# Patient Record
Sex: FEMALE | Race: ASIAN | Employment: FULL TIME | URBAN - METROPOLITAN AREA
[De-identification: names, ages, dates, MRNs, and addresses within clinical notes are randomized per-mention and may not be internally consistent; named-entity substitution may affect disease eponyms.]

---

## 2017-03-02 ENCOUNTER — OFFICE VISIT (OUTPATIENT)
Dept: FAMILY MEDICINE CLINIC | Facility: CLINIC | Age: 27
End: 2017-03-02

## 2017-03-02 VITALS
DIASTOLIC BLOOD PRESSURE: 70 MMHG | SYSTOLIC BLOOD PRESSURE: 116 MMHG | BODY MASS INDEX: 30.5 KG/M2 | TEMPERATURE: 103 F | HEART RATE: 108 BPM | OXYGEN SATURATION: 98 % | WEIGHT: 170 LBS | HEIGHT: 62.5 IN | RESPIRATION RATE: 16 BRPM

## 2017-03-02 DIAGNOSIS — H65.192 OTHER ACUTE NONSUPPURATIVE OTITIS MEDIA OF LEFT EAR, RECURRENCE NOT SPECIFIED: Primary | ICD-10-CM

## 2017-03-02 DIAGNOSIS — R68.89 INFLUENZA-LIKE SYMPTOMS: ICD-10-CM

## 2017-03-02 PROCEDURE — 99202 OFFICE O/P NEW SF 15 MIN: CPT | Performed by: NURSE PRACTITIONER

## 2017-03-02 RX ORDER — FLUTICASONE PROPIONATE 50 MCG
2 SPRAY, SUSPENSION (ML) NASAL DAILY
Qty: 1 BOTTLE | Refills: 0 | Status: SHIPPED | OUTPATIENT
Start: 2017-03-02 | End: 2017-03-16

## 2017-03-02 RX ORDER — AMOXICILLIN 875 MG/1
875 TABLET, COATED ORAL 2 TIMES DAILY
Qty: 20 TABLET | Refills: 0 | Status: SHIPPED | OUTPATIENT
Start: 2017-03-02 | End: 2017-03-12

## 2017-03-02 NOTE — PROGRESS NOTES
Patient presents with:  Fever: 102.6 today, body aches, chills, cough and congestion. 1 emesis this am.   :    HPI:   Johnson Forward is a 28year old female who presents for upper respiratory symptoms for  2  days. Started suddenly. Getting worse.  Kasia Marie EARS:Left TM erythematous, + bulging, no retraction, no fluid, bony landmarks not visualized. Right TM clear pinkish, no bulging, no retraction, no fluid, bony landmarks visualized.    NOSE: nostrils patent, clear nasal drainage, nasal mucosa pinkish red an amoxicillin 875 MG Oral Tab 20 tablet 0      Sig: Take 1 tablet (875 mg total) by mouth 2 (two) times daily. Fluticasone Propionate 50 MCG/ACT Nasal Suspension 1 Bottle 0      Si sprays by Each Nare route daily.              Patient Instructions Your healthcare provider may check for fluid in the ear using a light called an otoscope to look into the ear canal. A puff of air is blown into the ear and your provider looks for movement of the eardrum.  If there is fluid behind the eardrum, the membrane If you have discharge from your ear, you can wipe it away with a washcloth and loosely plug the ear with cotton to catch further drainage. If you have a lot of fluid and pus draining from your ear, the eardrum has probably ruptured.  Ask your healthcare pro Influenza is also called the flu. It is a viral illness that affects the air passages of your lungs. It is different from the common cold. The flu can easily be passed from one to person to another.  It may be spread through the air by coughing and sneezing If you are 72 or older, talk with your provider about getting a pneumococcal vaccine every 5 years. You should also get this vaccine if you have chronic asthma or COPD. All adults should get a flu vaccine every fall. Ask your provider about this.   When to

## 2017-03-02 NOTE — PATIENT INSTRUCTIONS
· It is very important to complete full course of antibiotic. · REST AND FLUIDS  · Flonase  · Theraflu or Dayquil as packet insert.    · Robitussin DM as packet insert   · Acetaminophen or ibuprofen according to package instructions as needed for pain  · Antibiotic medicine is a common treatment for ear infections. However, recent studies have shown that the symptoms of ear infections often go away in a couple of days without antibiotics.  Bacteria can become resistant to antibiotics, and the medicine may c If you have a fever:   Rest until your temperature has fallen below 100°F (37.8°C). Then become as active as is comfortable. Ask your provider if you can take aspirin, acetaminophen, or ibuprofen to control your fever.  Anyone under the age of 24 with a v Symptoms of the flu may be mild or severe. They can include extreme tiredness (wanting to stay in bed all day), chills, fevers, muscle aches, soreness with eye movement, headache, and a dry, hacking cough.   Home care  Follow these guidelines when caring fo · Severe weakness or dizziness  · You get a fever or cough after getting better for a few days  Date Last Reviewed: 12/23/2014  © 4814-4756 The [address] [place] [address], [address] [place] [address]. All rights reserved.  This information is not i

## 2019-01-08 PROBLEM — Z86.2 HISTORY OF ANEMIA: Status: ACTIVE | Noted: 2019-01-08

## 2019-03-13 ENCOUNTER — LABORATORY ENCOUNTER (OUTPATIENT)
Dept: LAB | Facility: HOSPITAL | Age: 29
End: 2019-03-13
Payer: COMMERCIAL

## 2019-03-13 DIAGNOSIS — O02.1 MISSED AB: ICD-10-CM

## 2019-03-13 LAB
ANTIBODY SCREEN: NEGATIVE
RH BLOOD TYPE: POSITIVE

## 2019-03-13 PROCEDURE — 86850 RBC ANTIBODY SCREEN: CPT

## 2019-03-13 PROCEDURE — 86901 BLOOD TYPING SEROLOGIC RH(D): CPT

## 2019-03-13 PROCEDURE — 86900 BLOOD TYPING SEROLOGIC ABO: CPT

## 2019-03-26 ENCOUNTER — TELEPHONE (OUTPATIENT)
Dept: OBGYN UNIT | Facility: HOSPITAL | Age: 29
End: 2019-03-26

## 2019-08-02 LAB
ANTIBODY SCREEN OB: NEGATIVE
HEPATITIS B SURFACE ANTIGEN OB: NEGATIVE
HIV RESULT OB: NEGATIVE
RAPID PLASMA REAGIN OB: NONREACTIVE
RH FACTOR OB: POSITIVE

## 2019-10-16 ENCOUNTER — DIABETIC EDUCATION (OUTPATIENT)
Dept: ENDOCRINOLOGY CLINIC | Facility: CLINIC | Age: 29
End: 2019-10-16
Payer: COMMERCIAL

## 2019-10-16 VITALS — BODY MASS INDEX: 31.83 KG/M2 | HEIGHT: 62 IN | WEIGHT: 173 LBS

## 2019-10-16 DIAGNOSIS — O24.410 DIET CONTROLLED GESTATIONAL DIABETES MELLITUS (GDM) IN SECOND TRIMESTER: Primary | ICD-10-CM

## 2019-10-16 PROCEDURE — 97802 MEDICAL NUTRITION INDIV IN: CPT | Performed by: DIETITIAN, REGISTERED

## 2019-10-16 NOTE — PROGRESS NOTES
Glendy Courtney  JONES:[birth year] was seen for Gestational Diabetes Counseling: Individual/Group    Date: 10/16/2019  Referring Provider: Dr. Mick Red  Start time: 2:30 End time: 3:35    Assessment:Ht 62\"   Wt 173 lb (78.5 kg)   BMI 31.64 kg/m²     Histor support encouraged. Identification of lifestyle behaviors willing to change discussed. Training Tools Provided:  [facility] Diabetes and Pregnancy: A guide to self management  BG Log Sheets    Recommendations:      1. Follow recommended meal plan.

## 2019-10-24 ENCOUNTER — TELEPHONE (OUTPATIENT)
Dept: ENDOCRINOLOGY CLINIC | Facility: CLINIC | Age: 29
End: 2019-10-24

## 2019-10-24 DIAGNOSIS — O24.410 DIET CONTROLLED GESTATIONAL DIABETES MELLITUS (GDM) IN SECOND TRIMESTER: ICD-10-CM

## 2019-12-12 NOTE — PROGRESS NOTES
Outpatient Maternal-Fetal Medicine Consultation    Dear Dr. Kacy Mcgregor,    Thank you for requesting ultrasound evaluation and maternal fetal medicine consultation on your patient Dayton Márquez.   As you are aware she is a 26year old female with a Single 06/15/2019   BMI 31.28 kg/m²   General: alert and oriented,no acute distress  Abdomen: gravid, soft, non-tender  Extremities: non-tender, no edema    OBSTETRIC ULTRASOUND  The patient had a level II ultrasound today which I interpreted the results and revi left outflow tract, right outflow tract, Ductal arch, inferior vena cava, superior vena cava. Aortic arch: Normal.  Genitalia: normal.    Summary of Ultrasound Findings:  Impression: The fetal measurements are consistent with the established EDC.  No yehuda assessments (fasting and two-hour post-prandial) is good. Her overall glucose control is good. The patient is presently on diet therapy; her compliance with her diabetic diet regimen was reviewed and it is good.       We compared and contrasted insulin weeks  · Delivery is advised between 38-39 weeks for controlled GDM A2 but routine delivery care for GDM A1    Thank you for allowing me to participate in the care of your patient.   Please do not hesitate to contact me if additional questions or concerns a

## 2019-12-19 ENCOUNTER — OFFICE VISIT (OUTPATIENT)
Dept: PERINATAL CARE | Facility: HOSPITAL | Age: 29
End: 2019-12-19
Attending: OBSTETRICS & GYNECOLOGY
Payer: COMMERCIAL

## 2019-12-19 VITALS
WEIGHT: 171 LBS | DIASTOLIC BLOOD PRESSURE: 67 MMHG | BODY MASS INDEX: 31.47 KG/M2 | HEIGHT: 62 IN | SYSTOLIC BLOOD PRESSURE: 112 MMHG | HEART RATE: 103 BPM

## 2019-12-19 DIAGNOSIS — Z86.2 HISTORY OF ANEMIA: ICD-10-CM

## 2019-12-19 DIAGNOSIS — O24.410 DIET CONTROLLED GESTATIONAL DIABETES MELLITUS (GDM) IN THIRD TRIMESTER: ICD-10-CM

## 2019-12-19 PROCEDURE — 99244 OFF/OP CNSLTJ NEW/EST MOD 40: CPT | Performed by: OBSTETRICS & GYNECOLOGY

## 2019-12-19 PROCEDURE — 76811 OB US DETAILED SNGL FETUS: CPT | Performed by: OBSTETRICS & GYNECOLOGY

## 2019-12-19 RX ORDER — ACETAMINOPHEN 160 MG
2000 TABLET,DISINTEGRATING ORAL DAILY
COMMUNITY

## 2019-12-19 RX ORDER — MELATONIN
325
COMMUNITY

## 2019-12-19 RX ORDER — CHOLECALCIFEROL (VITAMIN D3) 25 MCG
1 TABLET,CHEWABLE ORAL DAILY
COMMUNITY

## 2020-01-02 LAB — HIV RESULT OB: NEGATIVE

## 2020-01-08 ENCOUNTER — TELEPHONE (OUTPATIENT)
Dept: PERINATAL CARE | Facility: HOSPITAL | Age: 30
End: 2020-01-08

## 2020-01-21 DIAGNOSIS — O24.410 DIET CONTROLLED GESTATIONAL DIABETES MELLITUS (GDM) IN SECOND TRIMESTER: Primary | ICD-10-CM

## 2020-01-28 ENCOUNTER — TELEPHONE (OUTPATIENT)
Dept: PERINATAL CARE | Facility: HOSPITAL | Age: 30
End: 2020-01-28

## 2020-02-25 LAB — STREP GP B CULT OB: NEGATIVE

## 2020-03-03 ENCOUNTER — TELEPHONE (OUTPATIENT)
Dept: OBGYN UNIT | Facility: HOSPITAL | Age: 30
End: 2020-03-03

## 2020-03-04 ENCOUNTER — TELEPHONE (OUTPATIENT)
Dept: OBGYN UNIT | Facility: HOSPITAL | Age: 30
End: 2020-03-04

## 2020-03-08 ENCOUNTER — HOSPITAL ENCOUNTER (INPATIENT)
Facility: HOSPITAL | Age: 30
LOS: 3 days | Discharge: HOME | End: 2020-03-11
Attending: OBSTETRICS & GYNECOLOGY | Admitting: OBSTETRICS & GYNECOLOGY
Payer: COMMERCIAL

## 2020-03-08 ENCOUNTER — APPOINTMENT (OUTPATIENT)
Dept: OBGYN CLINIC | Facility: HOSPITAL | Age: 30
End: 2020-03-08
Payer: COMMERCIAL

## 2020-03-08 PROBLEM — Z34.90 PREGNANCY (HCC): Status: ACTIVE | Noted: 2020-03-08

## 2020-03-08 PROBLEM — Z34.90 PREGNANCY: Status: ACTIVE | Noted: 2020-03-08

## 2020-03-08 LAB
ANTIBODY SCREEN: NEGATIVE
DEPRECATED RDW RBC AUTO: 41.1 FL (ref 35.1–46.3)
ERYTHROCYTE [DISTWIDTH] IN BLOOD BY AUTOMATED COUNT: 17.2 % (ref 11–15)
GLUCOSE BLD-MCNC: 94 MG/DL (ref 70–99)
HCT VFR BLD AUTO: 39.5 % (ref 35–48)
HGB BLD-MCNC: 12.1 G/DL (ref 12–16)
MCH RBC QN AUTO: 22.1 PG (ref 26–34)
MCHC RBC AUTO-ENTMCNC: 30.6 G/DL (ref 31–37)
MCV RBC AUTO: 72.1 FL (ref 80–100)
PLATELET # BLD AUTO: 296 10(3)UL (ref 150–450)
RBC # BLD AUTO: 5.48 X10(6)UL (ref 3.8–5.3)
RH BLOOD TYPE: POSITIVE
T PALLIDUM AB SER QL IA: NONREACTIVE
WBC # BLD AUTO: 8.8 X10(3) UL (ref 4–11)

## 2020-03-08 PROCEDURE — 3E0P7VZ INTRODUCTION OF HORMONE INTO FEMALE REPRODUCTIVE, VIA NATURAL OR ARTIFICIAL OPENING: ICD-10-PCS | Performed by: OBSTETRICS & GYNECOLOGY

## 2020-03-08 PROCEDURE — 86901 BLOOD TYPING SEROLOGIC RH(D): CPT | Performed by: OBSTETRICS & GYNECOLOGY

## 2020-03-08 PROCEDURE — 86900 BLOOD TYPING SEROLOGIC ABO: CPT | Performed by: OBSTETRICS & GYNECOLOGY

## 2020-03-08 PROCEDURE — 86850 RBC ANTIBODY SCREEN: CPT | Performed by: OBSTETRICS & GYNECOLOGY

## 2020-03-08 PROCEDURE — 82962 GLUCOSE BLOOD TEST: CPT

## 2020-03-08 PROCEDURE — 86780 TREPONEMA PALLIDUM: CPT | Performed by: OBSTETRICS & GYNECOLOGY

## 2020-03-08 PROCEDURE — 85027 COMPLETE CBC AUTOMATED: CPT | Performed by: OBSTETRICS & GYNECOLOGY

## 2020-03-08 RX ORDER — TERBUTALINE SULFATE 1 MG/ML
0.25 INJECTION, SOLUTION SUBCUTANEOUS AS NEEDED
Status: DISCONTINUED | OUTPATIENT
Start: 2020-03-08 | End: 2020-03-09 | Stop reason: HOSPADM

## 2020-03-08 RX ORDER — SODIUM CHLORIDE, SODIUM LACTATE, POTASSIUM CHLORIDE, CALCIUM CHLORIDE 600; 310; 30; 20 MG/100ML; MG/100ML; MG/100ML; MG/100ML
INJECTION, SOLUTION INTRAVENOUS CONTINUOUS
Status: DISCONTINUED | OUTPATIENT
Start: 2020-03-08 | End: 2020-03-09 | Stop reason: HOSPADM

## 2020-03-08 RX ORDER — IBUPROFEN 600 MG/1
600 TABLET ORAL ONCE AS NEEDED
Status: DISCONTINUED | OUTPATIENT
Start: 2020-03-08 | End: 2020-03-09 | Stop reason: HOSPADM

## 2020-03-08 RX ORDER — TRISODIUM CITRATE DIHYDRATE AND CITRIC ACID MONOHYDRATE 500; 334 MG/5ML; MG/5ML
30 SOLUTION ORAL AS NEEDED
Status: DISCONTINUED | OUTPATIENT
Start: 2020-03-08 | End: 2020-03-09 | Stop reason: HOSPADM

## 2020-03-08 RX ORDER — DEXTROSE, SODIUM CHLORIDE, SODIUM LACTATE, POTASSIUM CHLORIDE, AND CALCIUM CHLORIDE 5; .6; .31; .03; .02 G/100ML; G/100ML; G/100ML; G/100ML; G/100ML
INJECTION, SOLUTION INTRAVENOUS AS NEEDED
Status: DISCONTINUED | OUTPATIENT
Start: 2020-03-08 | End: 2020-03-09 | Stop reason: HOSPADM

## 2020-03-08 RX ORDER — ZOLPIDEM TARTRATE 5 MG/1
5 TABLET ORAL NIGHTLY PRN
Status: DISCONTINUED | OUTPATIENT
Start: 2020-03-08 | End: 2020-03-09 | Stop reason: HOSPADM

## 2020-03-09 ENCOUNTER — ANESTHESIA EVENT (OUTPATIENT)
Dept: OBGYN UNIT | Facility: HOSPITAL | Age: 30
End: 2020-03-09
Payer: COMMERCIAL

## 2020-03-09 ENCOUNTER — ANESTHESIA (OUTPATIENT)
Dept: OBGYN UNIT | Facility: HOSPITAL | Age: 30
End: 2020-03-09
Payer: COMMERCIAL

## 2020-03-09 LAB
GLUCOSE BLD-MCNC: 101 MG/DL (ref 70–99)
GLUCOSE BLD-MCNC: 106 MG/DL (ref 70–99)
GLUCOSE BLD-MCNC: 172 MG/DL (ref 70–99)
GLUCOSE BLD-MCNC: 59 MG/DL (ref 70–99)
GLUCOSE BLD-MCNC: 72 MG/DL (ref 70–99)
GLUCOSE BLD-MCNC: 85 MG/DL (ref 70–99)

## 2020-03-09 PROCEDURE — 3E033VJ INTRODUCTION OF OTHER HORMONE INTO PERIPHERAL VEIN, PERCUTANEOUS APPROACH: ICD-10-PCS | Performed by: OBSTETRICS & GYNECOLOGY

## 2020-03-09 PROCEDURE — 10907ZC DRAINAGE OF AMNIOTIC FLUID, THERAPEUTIC FROM PRODUCTS OF CONCEPTION, VIA NATURAL OR ARTIFICIAL OPENING: ICD-10-PCS | Performed by: OBSTETRICS & GYNECOLOGY

## 2020-03-09 PROCEDURE — 88307 TISSUE EXAM BY PATHOLOGIST: CPT | Performed by: OBSTETRICS & GYNECOLOGY

## 2020-03-09 PROCEDURE — 82962 GLUCOSE BLOOD TEST: CPT

## 2020-03-09 PROCEDURE — 0KQM0ZZ REPAIR PERINEUM MUSCLE, OPEN APPROACH: ICD-10-PCS | Performed by: OBSTETRICS & GYNECOLOGY

## 2020-03-09 RX ORDER — SIMETHICONE 80 MG
80 TABLET,CHEWABLE ORAL 3 TIMES DAILY PRN
Status: DISCONTINUED | OUTPATIENT
Start: 2020-03-09 | End: 2020-03-11

## 2020-03-09 RX ORDER — IBUPROFEN 600 MG/1
600 TABLET ORAL EVERY 6 HOURS
Status: DISCONTINUED | OUTPATIENT
Start: 2020-03-09 | End: 2020-03-11

## 2020-03-09 RX ORDER — DOCUSATE SODIUM 100 MG/1
100 CAPSULE, LIQUID FILLED ORAL
Status: DISCONTINUED | OUTPATIENT
Start: 2020-03-09 | End: 2020-03-11

## 2020-03-09 RX ORDER — ZOLPIDEM TARTRATE 5 MG/1
5 TABLET ORAL NIGHTLY PRN
Status: DISCONTINUED | OUTPATIENT
Start: 2020-03-09 | End: 2020-03-11

## 2020-03-09 RX ORDER — EPHEDRINE SULFATE/0.9% NACL/PF 25 MG/5 ML
5 SYRINGE (ML) INTRAVENOUS AS NEEDED
Status: DISCONTINUED | OUTPATIENT
Start: 2020-03-09 | End: 2020-03-09

## 2020-03-09 RX ORDER — BISACODYL 10 MG
10 SUPPOSITORY, RECTAL RECTAL ONCE AS NEEDED
Status: ACTIVE | OUTPATIENT
Start: 2020-03-09 | End: 2020-03-09

## 2020-03-09 RX ORDER — DIPHENHYDRAMINE HYDROCHLORIDE 50 MG/ML
12.5 INJECTION INTRAMUSCULAR; INTRAVENOUS EVERY 4 HOURS PRN
Status: DISCONTINUED | OUTPATIENT
Start: 2020-03-09 | End: 2020-03-09

## 2020-03-09 RX ORDER — ACETAMINOPHEN 325 MG/1
650 TABLET ORAL EVERY 6 HOURS PRN
Status: DISCONTINUED | OUTPATIENT
Start: 2020-03-09 | End: 2020-03-11

## 2020-03-09 NOTE — ANESTHESIA PREPROCEDURE EVALUATION
PRE-OP EVALUATION    Patient Name: Erick Merchant    Pre-op Diagnosis:  Intrauterine pregnancy    Labor epidural    Lucaric    Pre-op vitals reviewed. Temp: 98.3 °F (36.8 °C)  Pulse: 94  Resp: 18  BP: 105/57     Body mass index is 32.91 kg/m².     Laura Parry Cardiovascular        Exercise tolerance: good     MET: >4    (+) obesity                                       Endo/Other      (+) diabetes  gestational, not using insulin      (+) anemia                   Pulmonary    Negative pulmonar

## 2020-03-09 NOTE — PROGRESS NOTES
Pt is a 31year old female admitted to 110/110-A. Patient presents with:  Scheduled Induction: Pt being induced due to Gestational diabetes - diet controlled. Pt is  38w1d intra-uterine pregnancy. History obtained, consents signed.  Oriented

## 2020-03-09 NOTE — H&P
[facility]    PATIENT'S NAME: Jennie Franco   ATTENDING PHYSICIAN: Eleni Bosworth, M.D.    PATIENT ACCOUNT#:   [de-identified]    LOCATION:  [address] [place] [address]  MEDICAL RECORD #:   [id number]       YOB: 1990  ADMISSION DATE:       03/0 7 days. Her prior pregnancies are in 2019; she at 7 weeks had a missed AB and took Cytotec; that was at [facility]. SOCIAL HISTORY:  No smoking, no alcohol, no drug use.     PHYSICAL EXAMINATION:    VITAL SIGNS:  She is 180 pounds, 5 feet 2 inches t

## 2020-03-09 NOTE — ANESTHESIA PROCEDURE NOTES
Labor Analgesia  Performed by: Olga Lidia Mendieta MD  Authorized by: Olga Lidia Mendieta MD       General Information and Staff    Start Time:  3/9/2020 11:11 AM  End Time:  3/9/2020 11:30 AM  Anesthesiologist:  Olga Lidia Mendieta MD  Performed by:  Chantel Daily

## 2020-03-09 NOTE — PLAN OF CARE
Problem: BIRTH - VAGINAL/ SECTION  Goal: Fetal and maternal status remain reassuring during the birth process  Description  INTERVENTIONS:  - Monitor vital signs  - Monitor fetal heart rate  - Monitor uterine activity  - Monitor labor progression with strengthening/mobility  - Encourage toileting schedule  Outcome: Progressing

## 2020-03-10 LAB
BASOPHILS # BLD AUTO: 0.02 X10(3) UL (ref 0–0.2)
BASOPHILS NFR BLD AUTO: 0.2 %
DEPRECATED RDW RBC AUTO: 41.3 FL (ref 35.1–46.3)
EOSINOPHIL # BLD AUTO: 0.01 X10(3) UL (ref 0–0.7)
EOSINOPHIL NFR BLD AUTO: 0.1 %
ERYTHROCYTE [DISTWIDTH] IN BLOOD BY AUTOMATED COUNT: 16.2 % (ref 11–15)
GLUCOSE BLD-MCNC: 80 MG/DL (ref 70–99)
HCT VFR BLD AUTO: 30.8 % (ref 35–48)
HGB BLD-MCNC: 9.5 G/DL (ref 12–16)
IMM GRANULOCYTES # BLD AUTO: 0.09 X10(3) UL (ref 0–1)
IMM GRANULOCYTES NFR BLD: 0.7 %
LYMPHOCYTES # BLD AUTO: 1.36 X10(3) UL (ref 1–4)
LYMPHOCYTES NFR BLD AUTO: 11.1 %
MCH RBC QN AUTO: 22 PG (ref 26–34)
MCHC RBC AUTO-ENTMCNC: 30.8 G/DL (ref 31–37)
MCV RBC AUTO: 71.5 FL (ref 80–100)
MONOCYTES # BLD AUTO: 1 X10(3) UL (ref 0.1–1)
MONOCYTES NFR BLD AUTO: 8.2 %
NEUTROPHILS # BLD AUTO: 9.73 X10 (3) UL (ref 1.5–7.7)
NEUTROPHILS # BLD AUTO: 9.73 X10(3) UL (ref 1.5–7.7)
NEUTROPHILS NFR BLD AUTO: 79.7 %
PLATELET # BLD AUTO: 197 10(3)UL (ref 150–450)
RBC # BLD AUTO: 4.31 X10(6)UL (ref 3.8–5.3)
WBC # BLD AUTO: 12.2 X10(3) UL (ref 4–11)

## 2020-03-10 PROCEDURE — 85025 COMPLETE CBC W/AUTO DIFF WBC: CPT | Performed by: OBSTETRICS & GYNECOLOGY

## 2020-03-10 PROCEDURE — 82947 ASSAY GLUCOSE BLOOD QUANT: CPT | Performed by: OBSTETRICS & GYNECOLOGY

## 2020-03-10 NOTE — PROGRESS NOTES
[facility] Vaginal Delivery Progress Note    Agusto May Patient Status:  Inpatient    1990 MRN [id number]   [facility] 2SW-J Attending René Sow MD   Hosp Day # 2 PCP Sylvia Hannah MD     SUBJECTIVE:

## 2020-03-10 NOTE — L&D DELIVERY NOTE
[facility]    PATIENT'S NAME: Ursula Peralta   ATTENDING PHYSICIAN: Armida Pierce M.D.    PATIENT ACCOUNT #: [de-identified] LOCATION:  [address] [place] [address]   MEDICAL RECORD #: [id number] YOB: 1990   ADMISSION DATE: 03/08/2020 DELIVERY will have a fasting sugar in the morning. Tali Reyes and mom are doing well.     Dictated By Oralia Chavez M.D.  d: 03/09/2020 21:47:17  t: 03/10/2020 09:30:17  [facility] [id number]  MR/

## 2020-03-10 NOTE — PROGRESS NOTES
Pt transferred to Mother Baby room 2196 in stable condition. Report given to [facility] - STANISLAW MCKEON. Infant transferred with mother in stable condition.

## 2020-03-10 NOTE — PROGRESS NOTES
Labor Analgesia Follow Up Note    Patient underwent epidural anesthesia for labor analgesia,    Placenta Date/Time: 3/9/2020  7:44 PM    Delivery Date/Time[de-identified] 3/9/2020  7:25 PM    /65 (BP Location: Left arm)   Pulse 102   Temp 98.3 °F (36.8 °C) (Oral)

## 2020-03-10 NOTE — L&D DELIVERY NOTE
Gabriella Carvalho [[id number]]    Labor Events     labor?:  No   steroids?:  None  Cervical ripening date/time:  3/8/2020 1830  Cervical ripening type:  Cervidil  Antibiotics received during labor?:  No  Antibiotics (enter # doses in comment):  none Intact  Disposition:  Pathology     Apgars    Living status:  Living   Apgar Scoring Key:     0 1 2    Skin color Blue or pale Acrocyanotic Completely pink    Heart rate Absent <100 bpm >100 bpm    Reflex irritability No response Grimace Cry or active with

## 2020-03-10 NOTE — PROGRESS NOTES
Pt received via wheelchair, carrying infant to room 2196. Accompanied by her  and belongings. ID bands verified with Elder Jacob. Hugs and kisses already in place. Bed low, locked  Call light given and explained to pt.   Pt instructed to call to go to

## 2020-03-11 VITALS
HEART RATE: 87 BPM | OXYGEN SATURATION: 97 % | DIASTOLIC BLOOD PRESSURE: 74 MMHG | RESPIRATION RATE: 18 BRPM | HEIGHT: 62.01 IN | SYSTOLIC BLOOD PRESSURE: 113 MMHG | WEIGHT: 180 LBS | TEMPERATURE: 98 F | BODY MASS INDEX: 32.71 KG/M2

## 2020-03-11 RX ORDER — IBUPROFEN 600 MG/1
600 TABLET ORAL EVERY 6 HOURS
Qty: 30 TABLET | Refills: 0 | Status: SHIPPED | OUTPATIENT
Start: 2020-03-11

## 2020-03-11 NOTE — PROGRESS NOTES
PPD2    S: doing well, pain ok with motrin, bleeding minimal  O: /74 (BP Location: Left arm)   Pulse 87   Temp 98.1 °F (36.7 °C) (Oral)   Resp 18   Ht 5' 2.01\" (1.575 m)   Wt 180 lb (81.6 kg)   LMP 06/15/2019   SpO2 97%   Breastfeeding Yes   BMI 32.

## 2020-03-11 NOTE — DISCHARGE SUMMARY
[facility]  Discharge Summary    Glendy Parsons Patient Status:  Inpatient    1990 MRN [id number]   [facility] 2SW-J Attending Randi Ogden, [address] [facility] Se Day # 3 PCP Philippe Gonsalez MD     Date of Admission: 3/8/2020    Date of D

## 2020-03-15 ENCOUNTER — TELEPHONE (OUTPATIENT)
Dept: OBGYN UNIT | Facility: HOSPITAL | Age: 30
End: 2020-03-15

## 2020-03-16 ENCOUNTER — TELEPHONE (OUTPATIENT)
Dept: OBGYN UNIT | Facility: HOSPITAL | Age: 30
End: 2020-03-16

## 2023-09-19 ENCOUNTER — ULTRASOUND ENCOUNTER (OUTPATIENT)
Facility: CLINIC | Age: 33
End: 2023-09-19
Payer: COMMERCIAL

## 2023-09-19 ENCOUNTER — LAB ENCOUNTER (OUTPATIENT)
Dept: LAB | Age: 33
End: 2023-09-19
Attending: OBSTETRICS & GYNECOLOGY
Payer: COMMERCIAL

## 2023-09-19 ENCOUNTER — OFFICE VISIT (OUTPATIENT)
Facility: CLINIC | Age: 33
End: 2023-09-19
Payer: COMMERCIAL

## 2023-09-19 VITALS
BODY MASS INDEX: 34.01 KG/M2 | SYSTOLIC BLOOD PRESSURE: 148 MMHG | HEIGHT: 62 IN | WEIGHT: 184.81 LBS | DIASTOLIC BLOOD PRESSURE: 96 MMHG | HEART RATE: 107 BPM

## 2023-09-19 DIAGNOSIS — O36.80X0 PREGNANCY WITH INCONCLUSIVE FETAL VIABILITY, SINGLE OR UNSPECIFIED FETUS: ICD-10-CM

## 2023-09-19 DIAGNOSIS — O03.9 SAB (SPONTANEOUS ABORTION): ICD-10-CM

## 2023-09-19 DIAGNOSIS — O03.9 SAB (SPONTANEOUS ABORTION): Primary | ICD-10-CM

## 2023-09-19 LAB — B-HCG SERPL-ACNC: ABNORMAL MIU/ML

## 2023-09-19 PROCEDURE — 3080F DIAST BP >= 90 MM HG: CPT | Performed by: OBSTETRICS & GYNECOLOGY

## 2023-09-19 PROCEDURE — 36415 COLL VENOUS BLD VENIPUNCTURE: CPT

## 2023-09-19 PROCEDURE — 99203 OFFICE O/P NEW LOW 30 MIN: CPT | Performed by: OBSTETRICS & GYNECOLOGY

## 2023-09-19 PROCEDURE — 3077F SYST BP >= 140 MM HG: CPT | Performed by: OBSTETRICS & GYNECOLOGY

## 2023-09-19 PROCEDURE — 76801 OB US < 14 WKS SINGLE FETUS: CPT | Performed by: OBSTETRICS & GYNECOLOGY

## 2023-09-19 PROCEDURE — 3008F BODY MASS INDEX DOCD: CPT | Performed by: OBSTETRICS & GYNECOLOGY

## 2023-09-19 PROCEDURE — 84702 CHORIONIC GONADOTROPIN TEST: CPT

## 2023-09-19 RX ORDER — MISOPROSTOL 200 UG/1
800 TABLET ORAL EVERY 12 HOURS
Qty: 8 TABLET | Refills: 0 | Status: SHIPPED | OUTPATIENT
Start: 2023-09-19

## 2023-09-19 NOTE — PROGRESS NOTES
Zoe Aly is a 28year old female  Patient's last menstrual period was 2023. Patient presents with:  Gyn Problem: Discuss US  LMP 23  Other: [id number] [address] Jakcson with student   . Patient 9w3d pregnant by LMP, US today showed 9 wk pregnancy - no FHT    OBSTETRICS HISTORY:  OB History    Para Term  AB Living   3 1 1   1 1   SAB IAB Ectopic Multiple Live Births   1     0 1      # Outcome Date GA Lbr Bridger/2nd Weight Sex Delivery Anes PTL Lv   3 Term 20 38w2d 08:19 / 00:36 6 lb 9.8 oz (3 kg) M NORMAL SPONT EPI N GM      Complications: Variable decelerations, Late decelerations   2 SAB 2019 7w0d    SAB   FD   1                 GYNE HISTORY:  Periods regular monthly      Sexual activity:   Yes      Partners:   Male        Hx Prior Abnormal Pap: No  Pap Date:  (2020 per pt)  Pap Result Notes: neg per pt        MEDICAL HISTORY:  Past Medical History:   Diagnosis Date    Amenorrhea 2023    Positive pregnancy test    Anemia     Blood disorder     anemia    Gestational diabetes     diet controlled. Visual impairment     glasses and contact lenses       SURGICAL HISTORY:  History reviewed. No pertinent surgical history.     SOCIAL HISTORY:  Social History    Socioeconomic History      Marital status:       Spouse name: Not on file      Number of children: Not on file      Years of education: Not on file      Highest education level: Not on file    Occupational History      Not on file    Tobacco Use      Smoking status: Former        Types: Cigarettes        Quit date: 3/4/2019        Years since quittin.5      Smokeless tobacco: Never      Tobacco comments: once a month    Vaping Use      Vaping Use: Former    Substance and Sexual Activity      Alcohol use: No      Drug use: No      Sexual activity: Yes        Partners: Male    Other Topics      Concerns:        Caffeine Concern: No        Exercise: No        Seat Belt: No        Special Diet: No        Stress Concern: No        Weight Concern: Yes    Social History Narrative      Not on file    Social Determinants of Health  Financial Resource Strain: Not on file  Food Insecurity: Not on file  Transportation Needs: Not on file  Physical Activity: Not on file  Stress: Not on file  Social Connections: Not on file  Housing Stability: Not on file    FAMILY HISTORY:  Family History   Problem Relation Age of Onset    Other (Lymphoma) Maternal Grandmother     Cancer Maternal Grandmother         Lymphnode cancer    Diabetes Paternal Grandmother        MEDICATIONS:    Current Outpatient Medications:     miSOPROStol 200 MCG Oral Tab, Place 4 tablets (800 mcg total) vaginally every 12 (twelve) hours. , Disp: 8 tablet, Rfl: 0    prenatal multivitamin plus DHA 27-0.8-228 MG Oral Cap, Take 1 capsule by mouth daily. , Disp: , Rfl:     ALLERGIES:  No Known Allergies        PHYSICAL EXAM:   Constitutional: well developed, well nourished  Head/Face: normocephalic  Skin/Hair: no unusual rashes or bruises  Extremities: no edema, no cyanosis  Psychiatric:  Oriented to time, place, person and situation. Appropriate mood and affect    Discussed with patient uS result, D&C vs expectant management vs medication , patient has h/o SAB and took medication at the time to initiate      Assessment & Plan:  Diagnoses and all orders for this visit:    SAB (spontaneous )  -     HCG, Beta Subunit, Quant; Future    Pregnancy with inconclusive fetal viability, single or unspecified fetus  -     US OB 1st Trimester Abdominal <14 wks [[id number]]; Future    Other orders  -     miSOPROStol 200 MCG Oral Tab; Place 4 tablets (800 mcg total) vaginally every 12 (twelve) hours.

## 2023-09-22 ENCOUNTER — TELEPHONE (OUTPATIENT)
Facility: CLINIC | Age: 33
End: 2023-09-22

## 2023-09-22 DIAGNOSIS — O20.0 THREATENED ABORTION, ANTEPARTUM: Primary | ICD-10-CM

## 2023-09-22 NOTE — TELEPHONE ENCOUNTER
9wks SAB-wants to know if she needs f/u US, Rx refill? She used Cytotec Wednesday evening and Thursday am she started to pass clots/tissues. She is still having some light bleeding. 9/19/23 seen in office SAB  Had US - no cardiac activity & had HCG 34,177     Denies fever, malodor discharge, increased pain, heavy bleeding -if pt experiences any of these s/s to go to ER. Hcg pended - monitor hcg trend. Patient verbalized understanding, agreed to and intend to comply with plan of care.

## 2023-09-22 NOTE — TELEPHONE ENCOUNTER
Patient was seen on Sept 19, she was miscarrying dr prescribed medication for her. She took  the medication Wednesday evening and Thursday am she started to pass clots. She is still having some light bleeding. She is wondering if she should schedule an ultra sound and does she need a refill on the medication?

## 2023-09-26 ENCOUNTER — LAB ENCOUNTER (OUTPATIENT)
Dept: LAB | Age: 33
End: 2023-09-26
Attending: OBSTETRICS & GYNECOLOGY
Payer: COMMERCIAL

## 2023-09-26 DIAGNOSIS — O20.0 THREATENED ABORTION, ANTEPARTUM: ICD-10-CM

## 2023-09-26 LAB — B-HCG SERPL-ACNC: 307 MIU/ML

## 2023-09-26 PROCEDURE — 84702 CHORIONIC GONADOTROPIN TEST: CPT

## 2023-09-26 PROCEDURE — 36415 COLL VENOUS BLD VENIPUNCTURE: CPT

## 2023-09-27 DIAGNOSIS — O03.9 SAB (SPONTANEOUS ABORTION): Primary | ICD-10-CM

## 2023-09-27 NOTE — PROGRESS NOTES
Pt aware of results & recommendations to repeat in 7 days. Aware we will follow HCG level to zero. Order pended. Verbalized understanding.

## 2023-10-01 ENCOUNTER — TELEPHONE (OUTPATIENT)
Dept: OBGYN UNIT | Facility: HOSPITAL | Age: 33
End: 2023-10-01

## 2023-10-03 ENCOUNTER — LAB ENCOUNTER (OUTPATIENT)
Dept: LAB | Age: 33
End: 2023-10-03
Attending: OBSTETRICS & GYNECOLOGY
Payer: COMMERCIAL

## 2023-10-03 DIAGNOSIS — O03.9 SAB (SPONTANEOUS ABORTION): ICD-10-CM

## 2023-10-03 LAB — B-HCG SERPL-ACNC: 23 MIU/ML

## 2023-10-03 PROCEDURE — 36415 COLL VENOUS BLD VENIPUNCTURE: CPT

## 2023-10-03 PROCEDURE — 84702 CHORIONIC GONADOTROPIN TEST: CPT

## 2023-10-10 ENCOUNTER — LAB ENCOUNTER (OUTPATIENT)
Dept: LAB | Age: 33
End: 2023-10-10
Attending: OBSTETRICS & GYNECOLOGY
Payer: COMMERCIAL

## 2023-10-10 DIAGNOSIS — O03.9 SAB (SPONTANEOUS ABORTION): ICD-10-CM

## 2023-10-10 PROCEDURE — 36415 COLL VENOUS BLD VENIPUNCTURE: CPT

## 2023-10-10 PROCEDURE — 84702 CHORIONIC GONADOTROPIN TEST: CPT

## 2023-10-11 LAB — B-HCG SERPL-ACNC: 6 MIU/ML

## 2023-11-02 ENCOUNTER — LAB ENCOUNTER (OUTPATIENT)
Dept: LAB | Age: 33
End: 2023-11-02
Attending: OBSTETRICS & GYNECOLOGY
Payer: COMMERCIAL

## 2023-11-02 DIAGNOSIS — O03.9 SAB (SPONTANEOUS ABORTION): ICD-10-CM

## 2023-11-02 LAB — B-HCG SERPL-ACNC: <1 MIU/ML

## 2023-11-02 PROCEDURE — 36415 COLL VENOUS BLD VENIPUNCTURE: CPT

## 2023-11-02 PROCEDURE — 84702 CHORIONIC GONADOTROPIN TEST: CPT

## 2024-01-22 DIAGNOSIS — O36.80X0 PREGNANCY WITH INCONCLUSIVE FETAL VIABILITY, SINGLE OR UNSPECIFIED FETUS: Primary | ICD-10-CM

## 2024-01-29 ENCOUNTER — TELEPHONE (OUTPATIENT)
Facility: CLINIC | Age: 34
End: 2024-01-29

## 2024-01-29 ENCOUNTER — LAB ENCOUNTER (OUTPATIENT)
Dept: LAB | Age: 34
End: 2024-01-29
Payer: COMMERCIAL

## 2024-01-29 DIAGNOSIS — O09.299 HISTORY OF MISCARRIAGE, CURRENTLY PREGNANT: ICD-10-CM

## 2024-01-29 DIAGNOSIS — O03.9 SAB (SPONTANEOUS ABORTION): ICD-10-CM

## 2024-01-29 DIAGNOSIS — O09.299 HISTORY OF MISCARRIAGE, CURRENTLY PREGNANT: Primary | ICD-10-CM

## 2024-01-29 LAB
B-HCG SERPL-ACNC: ABNORMAL MIU/ML
PROGEST SERPL-MCNC: 18.4 NG/ML

## 2024-01-29 PROCEDURE — 84702 CHORIONIC GONADOTROPIN TEST: CPT

## 2024-01-29 PROCEDURE — 84144 ASSAY OF PROGESTERONE: CPT

## 2024-01-29 PROCEDURE — 36415 COLL VENOUS BLD VENIPUNCTURE: CPT

## 2024-01-29 NOTE — TELEPHONE ENCOUNTER
Pt called to report on Saturday 1/27/24 morning she experienced light bleeding and lower back pain. Pt scheduled for FOB on 2/1/24, wanted to know if anything would suggested until then.

## 2024-01-29 NOTE — TELEPHONE ENCOUNTER
Spoke with pt. LMP- 11/26/23 9 weeks 1 day G- 4 P- 1 SAB 2017 & 9/2023. HCG zero 11/2/23 FOB- 2/1/24.    C/O light spotting on Saturday. No further spotting, denies cramping. Aware will get an HCG & progesterone level and repeat HCG in 48 hours. Orders pended.    To call with any vaginal bleeding or abdominal pain. Will call pt once orders have been placed. Verbalized understanding.

## 2024-01-31 PROBLEM — D64.9 ANEMIA: Status: ACTIVE | Noted: 2023-01-26

## 2024-01-31 PROBLEM — D50.9 IRON DEFICIENCY ANEMIA: Status: ACTIVE | Noted: 2023-01-26

## 2024-01-31 PROBLEM — E66.9 OBESITY: Status: ACTIVE | Noted: 2023-01-26

## 2024-02-01 ENCOUNTER — INITIAL PRENATAL (OUTPATIENT)
Facility: CLINIC | Age: 34
End: 2024-02-01
Payer: COMMERCIAL

## 2024-02-01 ENCOUNTER — ULTRASOUND ENCOUNTER (OUTPATIENT)
Facility: CLINIC | Age: 34
End: 2024-02-01
Payer: COMMERCIAL

## 2024-02-01 VITALS
DIASTOLIC BLOOD PRESSURE: 76 MMHG | SYSTOLIC BLOOD PRESSURE: 114 MMHG | BODY MASS INDEX: 34.3 KG/M2 | HEART RATE: 90 BPM | HEIGHT: 62 IN | WEIGHT: 186.38 LBS

## 2024-02-01 DIAGNOSIS — Z12.4 CERVICAL CANCER SCREENING: ICD-10-CM

## 2024-02-01 DIAGNOSIS — O99.210 SEVERE OBESITY DUE TO EXCESS CALORIES AFFECTING PREGNANCY, ANTEPARTUM (HCC): ICD-10-CM

## 2024-02-01 DIAGNOSIS — Z3A.09 9 WEEKS GESTATION OF PREGNANCY: ICD-10-CM

## 2024-02-01 DIAGNOSIS — E66.01 SEVERE OBESITY DUE TO EXCESS CALORIES AFFECTING PREGNANCY, ANTEPARTUM (HCC): ICD-10-CM

## 2024-02-01 DIAGNOSIS — O36.80X0 PREGNANCY WITH INCONCLUSIVE FETAL VIABILITY, SINGLE OR UNSPECIFIED FETUS: ICD-10-CM

## 2024-02-01 DIAGNOSIS — Z34.81 PRENATAL CARE, SUBSEQUENT PREGNANCY IN FIRST TRIMESTER: Primary | ICD-10-CM

## 2024-02-01 PROBLEM — O24.410 DIET CONTROLLED GESTATIONAL DIABETES MELLITUS (GDM) IN THIRD TRIMESTER: Status: RESOLVED | Noted: 2019-12-19 | Resolved: 2024-02-01

## 2024-02-01 PROBLEM — O09.521 MULTIGRAVIDA OF ADVANCED MATERNAL AGE IN FIRST TRIMESTER (HCC): Status: ACTIVE | Noted: 2024-02-01

## 2024-02-01 PROBLEM — O09.521 MULTIGRAVIDA OF ADVANCED MATERNAL AGE IN FIRST TRIMESTER: Status: ACTIVE | Noted: 2024-02-01

## 2024-02-01 PROBLEM — O24.410 DIET CONTROLLED GESTATIONAL DIABETES MELLITUS (GDM) IN THIRD TRIMESTER (HCC): Status: RESOLVED | Noted: 2019-12-19 | Resolved: 2024-02-01

## 2024-02-01 PROCEDURE — 87491 CHLMYD TRACH DNA AMP PROBE: CPT | Performed by: OBSTETRICS & GYNECOLOGY

## 2024-02-01 PROCEDURE — 87624 HPV HI-RISK TYP POOLED RSLT: CPT | Performed by: OBSTETRICS & GYNECOLOGY

## 2024-02-01 PROCEDURE — 87591 N.GONORRHOEAE DNA AMP PROB: CPT | Performed by: OBSTETRICS & GYNECOLOGY

## 2024-02-01 PROCEDURE — 76801 OB US < 14 WKS SINGLE FETUS: CPT | Performed by: OBSTETRICS & GYNECOLOGY

## 2024-02-01 PROCEDURE — 87077 CULTURE AEROBIC IDENTIFY: CPT | Performed by: OBSTETRICS & GYNECOLOGY

## 2024-02-01 PROCEDURE — 88175 CYTOPATH C/V AUTO FLUID REDO: CPT | Performed by: OBSTETRICS & GYNECOLOGY

## 2024-02-01 PROCEDURE — 87086 URINE CULTURE/COLONY COUNT: CPT | Performed by: OBSTETRICS & GYNECOLOGY

## 2024-02-01 PROCEDURE — 87186 SC STD MICRODIL/AGAR DIL: CPT | Performed by: OBSTETRICS & GYNECOLOGY

## 2024-02-01 RX ORDER — CHOLECALCIFEROL (VITAMIN D3) 25 MCG
1 TABLET,CHEWABLE ORAL DAILY
COMMUNITY

## 2024-02-01 NOTE — PROGRESS NOTES
New OB  - patient c/o feeling tired and nauseous, declines medication  - denies h/o HSV, blood transfusion, hepatitis  - desires NIPS - next visit, states she had carrier screen with first pregnancy - negative  - EDC by LMP c/w 9w4d US  - pap smear today    Obesity  - limit weight gain  - L2U, 32 wk growth, NST 36 weeks  - check early 1GTT, CMP HbA1C    2. AMA  - as above  - will advise ASA next visit

## 2024-02-02 LAB
C TRACH DNA SPEC QL NAA+PROBE: NEGATIVE
HPV I/H RISK 1 DNA SPEC QL NAA+PROBE: NEGATIVE
N GONORRHOEA DNA SPEC QL NAA+PROBE: NEGATIVE

## 2024-02-05 ENCOUNTER — LAB ENCOUNTER (OUTPATIENT)
Dept: LAB | Age: 34
End: 2024-02-05
Attending: OBSTETRICS & GYNECOLOGY
Payer: COMMERCIAL

## 2024-02-05 DIAGNOSIS — O09.299 HISTORY OF MISCARRIAGE, CURRENTLY PREGNANT: ICD-10-CM

## 2024-02-05 DIAGNOSIS — E66.01 SEVERE OBESITY DUE TO EXCESS CALORIES AFFECTING PREGNANCY, ANTEPARTUM (HCC): ICD-10-CM

## 2024-02-05 DIAGNOSIS — O99.210 SEVERE OBESITY DUE TO EXCESS CALORIES AFFECTING PREGNANCY, ANTEPARTUM (HCC): ICD-10-CM

## 2024-02-05 DIAGNOSIS — Z34.81 PRENATAL CARE, SUBSEQUENT PREGNANCY IN FIRST TRIMESTER: ICD-10-CM

## 2024-02-05 LAB
ALBUMIN SERPL-MCNC: 3.7 G/DL (ref 3.4–5)
ALBUMIN/GLOB SERPL: 0.9 {RATIO} (ref 1–2)
ALP LIVER SERPL-CCNC: 48 U/L
ALT SERPL-CCNC: 25 U/L
ANION GAP SERPL CALC-SCNC: 6 MMOL/L (ref 0–18)
ANTIBODY SCREEN: NEGATIVE
AST SERPL-CCNC: 17 U/L (ref 15–37)
BASOPHILS # BLD AUTO: 0.03 X10(3) UL (ref 0–0.2)
BASOPHILS NFR BLD AUTO: 0.3 %
BILIRUB SERPL-MCNC: 0.4 MG/DL (ref 0.1–2)
BUN BLD-MCNC: 9 MG/DL (ref 9–23)
CALCIUM BLD-MCNC: 9.6 MG/DL (ref 8.5–10.1)
CHLORIDE SERPL-SCNC: 109 MMOL/L (ref 98–112)
CO2 SERPL-SCNC: 24 MMOL/L (ref 21–32)
CREAT BLD-MCNC: 0.77 MG/DL
EGFRCR SERPLBLD CKD-EPI 2021: 103 ML/MIN/1.73M2 (ref 60–?)
EOSINOPHIL # BLD AUTO: 0.12 X10(3) UL (ref 0–0.7)
EOSINOPHIL NFR BLD AUTO: 1.2 %
ERYTHROCYTE [DISTWIDTH] IN BLOOD BY AUTOMATED COUNT: 16.1 %
EST. AVERAGE GLUCOSE BLD GHB EST-MCNC: 123 MG/DL (ref 68–126)
FASTING STATUS PATIENT QL REPORTED: NO
GLOBULIN PLAS-MCNC: 4.2 G/DL (ref 2.8–4.4)
GLUCOSE BLD-MCNC: 94 MG/DL (ref 70–99)
HBA1C MFR BLD: 5.9 % (ref ?–5.7)
HCT VFR BLD AUTO: 37.1 %
HGB BLD-MCNC: 11.4 G/DL
IMM GRANULOCYTES # BLD AUTO: 0.04 X10(3) UL (ref 0–1)
IMM GRANULOCYTES NFR BLD: 0.4 %
LYMPHOCYTES # BLD AUTO: 2.35 X10(3) UL (ref 1–4)
LYMPHOCYTES NFR BLD AUTO: 24.4 %
MCH RBC QN AUTO: 21.3 PG (ref 26–34)
MCHC RBC AUTO-ENTMCNC: 30.7 G/DL (ref 31–37)
MCV RBC AUTO: 69.3 FL
MONOCYTES # BLD AUTO: 0.57 X10(3) UL (ref 0.1–1)
MONOCYTES NFR BLD AUTO: 5.9 %
NEUTROPHILS # BLD AUTO: 6.53 X10 (3) UL (ref 1.5–7.7)
NEUTROPHILS # BLD AUTO: 6.53 X10(3) UL (ref 1.5–7.7)
NEUTROPHILS NFR BLD AUTO: 67.8 %
OSMOLALITY SERPL CALC.SUM OF ELEC: 286 MOSM/KG (ref 275–295)
PLATELET # BLD AUTO: 398 10(3)UL (ref 150–450)
POTASSIUM SERPL-SCNC: 3.6 MMOL/L (ref 3.5–5.1)
PROT SERPL-MCNC: 7.9 G/DL (ref 6.4–8.2)
RBC # BLD AUTO: 5.35 X10(6)UL
RH BLOOD TYPE: POSITIVE
SODIUM SERPL-SCNC: 139 MMOL/L (ref 136–145)
WBC # BLD AUTO: 9.6 X10(3) UL (ref 4–11)

## 2024-02-05 PROCEDURE — 85025 COMPLETE CBC W/AUTO DIFF WBC: CPT

## 2024-02-05 PROCEDURE — 83036 HEMOGLOBIN GLYCOSYLATED A1C: CPT

## 2024-02-05 PROCEDURE — 86803 HEPATITIS C AB TEST: CPT

## 2024-02-05 PROCEDURE — 86850 RBC ANTIBODY SCREEN: CPT

## 2024-02-05 PROCEDURE — 86901 BLOOD TYPING SEROLOGIC RH(D): CPT

## 2024-02-05 PROCEDURE — 86780 TREPONEMA PALLIDUM: CPT

## 2024-02-05 PROCEDURE — 87340 HEPATITIS B SURFACE AG IA: CPT

## 2024-02-05 PROCEDURE — 87389 HIV-1 AG W/HIV-1&-2 AB AG IA: CPT

## 2024-02-05 PROCEDURE — 80053 COMPREHEN METABOLIC PANEL: CPT

## 2024-02-05 PROCEDURE — 86762 RUBELLA ANTIBODY: CPT

## 2024-02-05 PROCEDURE — 36415 COLL VENOUS BLD VENIPUNCTURE: CPT

## 2024-02-05 PROCEDURE — 86900 BLOOD TYPING SEROLOGIC ABO: CPT

## 2024-02-06 LAB
HBV SURFACE AG SER-ACNC: <0.1 [IU]/L
HBV SURFACE AG SERPL QL IA: NONREACTIVE
HCV AB SERPL QL IA: NONREACTIVE
RUBV IGG SER QL: POSITIVE
RUBV IGG SER-ACNC: 170.8 IU/ML (ref 10–?)
T PALLIDUM AB SER QL IA: NONREACTIVE

## 2024-02-07 LAB
.: NORMAL
.: NORMAL

## 2024-02-08 RX ORDER — AMOXICILLIN AND CLAVULANATE POTASSIUM 500; 125 MG/1; MG/1
1 TABLET, FILM COATED ORAL 3 TIMES DAILY
Qty: 15 TABLET | Refills: 0 | Status: SHIPPED | OUTPATIENT
Start: 2024-02-08 | End: 2024-02-13

## 2024-02-09 NOTE — PROGRESS NOTES
Discussed provider's msg: patient has anemia - take PNV and extra iron, borderline HbA1c - should proceed with 1GTT. Pt does not have to be fasting. Patient verbalized understanding, agreed to and intend to comply with plan of care.

## 2024-02-28 ENCOUNTER — TELEPHONE (OUTPATIENT)
Facility: CLINIC | Age: 34
End: 2024-02-28

## 2024-02-28 ENCOUNTER — ROUTINE PRENATAL (OUTPATIENT)
Facility: CLINIC | Age: 34
End: 2024-02-28
Payer: COMMERCIAL

## 2024-02-28 VITALS
SYSTOLIC BLOOD PRESSURE: 112 MMHG | WEIGHT: 185.19 LBS | BODY MASS INDEX: 34.08 KG/M2 | HEART RATE: 96 BPM | HEIGHT: 62 IN | DIASTOLIC BLOOD PRESSURE: 66 MMHG

## 2024-02-28 DIAGNOSIS — O09.521 MULTIGRAVIDA OF ADVANCED MATERNAL AGE IN FIRST TRIMESTER (HCC): ICD-10-CM

## 2024-02-28 DIAGNOSIS — Z3A.13 13 WEEKS GESTATION OF PREGNANCY (HCC): ICD-10-CM

## 2024-02-28 DIAGNOSIS — Z34.81 PRENATAL CARE, SUBSEQUENT PREGNANCY IN FIRST TRIMESTER (HCC): Primary | ICD-10-CM

## 2024-02-28 NOTE — TELEPHONE ENCOUNTER
Patients name &  verified with patient   Lab tubes labeled   NIPT/Panorama screen lab drawn  Patient tolerated well. Test given to PSR for processing and send out.   Danish information given and patient instructed to call in 7-10 days to discuss results. Patient verbalized understanding.

## 2024-02-28 NOTE — PROGRESS NOTES
Patient has no complaints    - denies h/o HSV, blood transfusion, hepatitis  - desires NIPS -today, states she had carrier screen with first pregnancy - negative  - EDC by LMP c/w 9w4d US      Obesity  - limit weight gain  - L2U, 32 wk growth, NST 36 weeks  - HbA1C 5.9, reminded to have 1GTT    2. AMA  - as above  - advised to start ASA 1.5 tabs daily    3.UTI dx at 1st OB  - patient did not take abx because she has no symptoms  - urged to complete the course

## 2024-03-14 ENCOUNTER — LAB ENCOUNTER (OUTPATIENT)
Dept: LAB | Age: 34
End: 2024-03-14
Attending: OBSTETRICS & GYNECOLOGY
Payer: COMMERCIAL

## 2024-03-14 DIAGNOSIS — Z34.81 PRENATAL CARE, SUBSEQUENT PREGNANCY IN FIRST TRIMESTER (HCC): ICD-10-CM

## 2024-03-14 LAB — GLUCOSE 1H P GLC SERPL-MCNC: 199 MG/DL

## 2024-03-14 PROCEDURE — 82950 GLUCOSE TEST: CPT

## 2024-03-14 PROCEDURE — 36415 COLL VENOUS BLD VENIPUNCTURE: CPT

## 2024-03-15 DIAGNOSIS — R73.09 ELEVATED GLUCOSE TOLERANCE TEST: Primary | ICD-10-CM

## 2024-03-15 NOTE — PROGRESS NOTES
Patient aware of 1 hour glucose results and recommendations. Results reviewed. Please inform patient needs 3 GTT or presume has diabetes and follow up with diabetes educator. Patient would prefer to do a 3 hour GTT. Order placed and routed. Patient verbalized understanding.

## 2024-03-27 ENCOUNTER — ROUTINE PRENATAL (OUTPATIENT)
Dept: OBGYN CLINIC | Facility: CLINIC | Age: 34
End: 2024-03-27
Payer: COMMERCIAL

## 2024-03-27 VITALS
HEART RATE: 71 BPM | DIASTOLIC BLOOD PRESSURE: 81 MMHG | SYSTOLIC BLOOD PRESSURE: 129 MMHG | HEIGHT: 62 IN | BODY MASS INDEX: 34.89 KG/M2 | WEIGHT: 189.63 LBS

## 2024-03-27 DIAGNOSIS — O24.419 GESTATIONAL DIABETES MELLITUS (GDM), ANTEPARTUM, GESTATIONAL DIABETES METHOD OF CONTROL UNSPECIFIED (HCC): ICD-10-CM

## 2024-03-27 DIAGNOSIS — O99.810 GLUCOSE INTOLERANCE OF PREGNANCY (HCC): ICD-10-CM

## 2024-03-27 DIAGNOSIS — O99.210 OBESITY AFFECTING PREGNANCY, ANTEPARTUM, UNSPECIFIED OBESITY TYPE (HCC): ICD-10-CM

## 2024-03-27 DIAGNOSIS — O09.529 ANTEPARTUM MULTIGRAVIDA OF ADVANCED MATERNAL AGE (HCC): Primary | ICD-10-CM

## 2024-03-27 RX ORDER — ASPIRIN 81 MG/1
81 TABLET ORAL DAILY
COMMUNITY

## 2024-03-27 NOTE — PROGRESS NOTES
FELISHA - 17w3d   Pt doing well, no complaints      - EDC by LMP c/w 9w4d US  -NIPT done - resulted negative but \"insufficient specimen\" for result on X chromosome, could not rule out or rule in monosomy X. Other aneuploidies were low risk. Offered repeat specimen - pt declines  -states she had carrier screen with first pregnancy - negative    Obesity  -BMI 34 at initial OB visit  - limit weight gain  - L2U - ordered  -32 wk growth  -NST 36 weeks  - HbA1C 5.9, early 1h GTT elevated to 199 - pt offered 3h GTT vs DM education to start checking sugars, will just start checking sugars, had GDM last pregnancy, DM educator referral ordered    2. AMA  - as above  - advised to start ASA 1.5 tabs daily    3.UTI dx at 1st OB  - resistant to ampicillin, rxed augmentin, patient did not take abx because she has no symptoms  - urged to complete the course - pt took abx

## 2024-04-09 NOTE — PROGRESS NOTES
Outpatient Maternal-Fetal Medicine Consultation    Dear Dr. Ashley    Thank you for requesting ultrasound evaluation and maternal fetal medicine consultation on your patient Glendy Koo.  As you are aware she is a 34 year old female  with a singletonpregnancy and an Estimated Date of Delivery: 24.  A maternal-fetal medicine consultation was requested secondary to advanced maternal age and obesity.  Her prenatal records and labs were reviewed.    HISTORY  # 1 - Date: 2019, Sex: None, Weight: None, GA: 7w0d, Type: Spontaneous , Apgar1: None, Apgar5: None, Living: Fetal Demise, Birth Comments: None    # 2 - Date: 20, Sex: Male, Weight: 6 lb 9.8 oz (3 kg), GA: 38w2d, Type: Normal spontaneous vaginal delivery, Apgar1: 8, Apgar5: 9, Living: Living, Birth Comments: None    # 3 - Date: 2023, Sex: None, Weight: None, GA: None, Type: None, Apgar1: None, Apgar5: None, Living: None, Birth Comments: None    # 4 - Date: None, Sex: None, Weight: None, GA: None, Type: None, Apgar1: None, Apgar5: None, Living: None, Birth Comments: None      Past Medical History  The patient  has a past medical history of Amenorrhea (2023), Anemia, Blood disorder, Gestational diabetes (HCC), Pap smear for cervical cancer screening (01/15/2019), and Visual impairment.    She has no past medical history of Anesthesia complication, Difficult intubation, Malignant hyperthermia, PONV (postoperative nausea and vomiting), or Pseudocholinesterase deficiency.    Past Surgical History  The patient  has no past surgical history on file.    Family History  The patient She indicated that her mother is alive. She indicated that her father is alive. She indicated that the status of her maternal grandmother is unknown. She indicated that the status of her paternal grandmother is unknown.      Medications:   Current Outpatient Medications:     aspirin 81 MG Oral Tab EC, Take 2 tablets (162 mg total) by mouth daily.,  Disp: , Rfl:     prenatal vitamin with DHA 27-0.8-228 MG Oral Cap, Take 1 capsule by mouth daily., Disp: , Rfl:     Glucose Blood (ONETOUCH VERIO) In Vitro Strip, Test blood sugar twice daily, Disp: 150 strip, Rfl: 3    OneTouch Delica Lancets 30G Does not apply Misc, 1 each 4 (four) times daily., Disp: 200 each, Rfl: 3    Blood Glucose Monitoring Suppl (ONETOUCH VERIO FLEX SYSTEM) w/Device Does not apply Kit, 1 each As Directed., Disp: 1 kit, Rfl: 0  Allergies: No Known Allergies    PHYSICAL EXAMINATION:  /71 (BP Location: Right arm, Patient Position: Sitting, Cuff Size: adult)   Pulse 91   Ht 5' 2\" (1.575 m)   Wt 190 lb (86.2 kg)   LMP 11/26/2023   BMI 34.75 kg/m²   General: alert and oriented,no acute distress  Abdomen: gravid, soft, non-tender  Extremities: non-tender, no edema    OBSTETRIC ULTRASOUND  The patient had a level II ultrasound today which revealed size consistent with dates and a normal detailed anatomic survey.      Ultrasound Findings:  Single IUP in breech presentation.    Placenta is anterior.   A 3 vessel cord is noted.  Cardiac activity is present at 145 bpm   g ( 0 lb 12 oz);   MVP is 4.5 cm .     The fetal measurements are consistent with the established EDC. No ultrasound evidence of structural abnormalities are seen today. The nasal bone is present. No ultrasound evidence of markers for aneuploidy are seen. She understands that ultrasound exam cannot exclude genetic abnormalities and that genetic testing is recommended. The limitations of ultrasound were discussed.     Uterus and adnexa appeared normal  today on US    See PACS/Imaging Tab For Complete Ultrasound Report  I interpreted the results and reviewed them with the patient.    DISCUSSION  During her visit we discussed and reviewed the following issues:  ADVANCED MATERNAL AGE    Background  I reviewed with the patient that pregnancies in women of advanced maternal age (35 or older at delivery) are associated with  elevated risks.  Specifically, there is a higher rate of:    Fetal malformations  Preeclampsia  Gestational diabetes  Intrauterine fetal death    As a result, enhanced pregnancy surveillance is advised for these patients including a comprehensive ultrasound to assess for fetal malformations  (at 20 weeks) and a third trimester ultrasound assessment for fetal growth (at 32 weeks).  In addition, weekly NST's (initiating at 36 weeks gestation for women 35-39 years and at 32 weeks gestation for women 40 years and older) are also advised.  Routine obstetric care is more than adequate to assess for gestational diabetes and preeclampsia; hence, no further significant alterations in obstetric care are advised.    Fetal Aneuploidy    We also discussed the increased risk of chromosomal abnormalities associated with advanced maternal age.  I reviewed that an ultrasound examination cannot reliably exclude potential genetic abnormalities. Given that the patient will be 34 years old at the time of delivery I reviewed that her risk (at delivery) of having a  with any chromosome abnormality is 1:192  and with trisomy 21 is 1: 378.    Invasive Testing    I offered invasive genetic testing (amniocentesis, chorionic villus sampling) after reviewing the diagnostic accuracy of these tests as well as the procedure associated loss rate (1:500 for genetic amniocentesis).    She ultimately does not desire invasive genetic testing.     Non-invasive Pregnancy Testing (NIPT)    I reviewed current non-invasive screening options.  Currently non-invasive pregnancy testing (NIPT) offers the highest detection rate (with the lowest false positive rate) for the detection of fetal aneuploidy amongst high-risk patients.  The limitations of detailed mid-trimester sonography was reviewed with the patient.  First trimester screening and second trimester multiple-marker serum serum screening as alternative aneuploidy screening options were also  reviewed.  However, both of these tests are associated with lower detection and higher false positive rates.    The patient has already obtained a low-risk  NPIT result and was appropriately reassured.     GESTATIONAL DIABETES      The patient was informed of the potential implications and risks associated with GDM to her and her fetus, especially when poorly controlled. We discussed the increased incidence of macrosomia and the potential for related birth injury to her and her baby. We talked about the increase risks associated risk of fetal hyperinsulinemia, jaundice, electrolyte imbalance, seizure activity, IUFD and other adverse obstetric outcomes. We also reviewed her  increased risk of having diabetes later in life. The importance of good glycemic control and avoidance of prolonged hypoglycemia and hyperglycemia was addressed.    She has received instructions on the diabetic diet; she was advised to provide three meals and three snacks with fewer carbohydrates.  She received instruction on self-monitoring of blood glucose.  She was also advised to assess  her blood sugars four times daily (fasting and 2 hour postprandially).   Fasting blood glucose should be less than 95 mg/dl and two hour postprandial values should be less than 120 mg/dl.   She was also told to send her capillary blood glucose records to OB office for weekly review. Insulin therapy may be started depending on her blood sugar levels.  Please let M know if you want our service to manage patient's GDM.    Medical Regimen Recommendation:   Continue ADA diet  Weekly OB review of capillary blood glucose values  Let M know if you want our service to manage patient's GDM      IMPRESSION:  IUP at 20w2d  AMA: low-risk cell free fetal DNA, declined invasive testing  Obesity  GDM A1 -on ADA diet, managed by OB    RECOMMENDATIONS:  Continue care with Dr. Ashley  Continue four times daily capillary blood glucose assessments (fasting and 2 hour  postprandial)  Continue ADA diet  Weekly OB review of capillary blood glucose values  Let MFM know if you want our service to manage patient's GDM  Follow-up growth ultrasound every 4 weeks in the third trimester  Weekly NSTs at 36 weeks  If medications are required for glucose control: Weekly NSTs at 32 weeks; twice weekly NST's at 34 weeks  Delivery at 39-39w6d for well controlled diabetes.    Thank you for allowing me to participate in the care of your patient.  Please do not hesitate to contact me if additional questions or concerns arise.      Isreal Wyatt M.D.    40 minutes spent in review of records, patient consultation, documentation and coordination of care.  The relevant clinical matter(s) are summarized above.     Note to patient and family  The 21st Century Cures Act makes medical notes available to patients in the interest of transparency.  However, please be advised that this is a medical document.  It is intended as [id number] communication.  It is written and medical language may contain abbreviations or verbiage that are technical and unfamiliar.  It may appear blunt or direct.  Medical documents are intended to carry relevant information, facts as evident, and the clinical opinion of the practitioner.

## 2024-04-10 ENCOUNTER — TELEPHONE (OUTPATIENT)
Dept: ENDOCRINOLOGY CLINIC | Facility: CLINIC | Age: 34
End: 2024-04-10

## 2024-04-10 DIAGNOSIS — O24.410 DIET CONTROLLED GESTATIONAL DIABETES MELLITUS (GDM), ANTEPARTUM (HCC): Primary | ICD-10-CM

## 2024-04-10 RX ORDER — BLOOD SUGAR DIAGNOSTIC
STRIP MISCELLANEOUS
Qty: 100 STRIP | Refills: 3 | Status: SHIPPED | OUTPATIENT
Start: 2024-04-10 | End: 2024-04-11

## 2024-04-10 RX ORDER — LANCETS 30 GAUGE
1 EACH MISCELLANEOUS 2 TIMES DAILY
Qty: 200 EACH | Refills: 3 | Status: SHIPPED | OUTPATIENT
Start: 2024-04-10 | End: 2024-04-11

## 2024-04-10 RX ORDER — BLOOD-GLUCOSE METER
1 EACH MISCELLANEOUS AS DIRECTED
Qty: 1 KIT | Refills: 0 | Status: SHIPPED | OUTPATIENT
Start: 2024-04-10 | End: 2024-04-24

## 2024-04-11 DIAGNOSIS — O24.410 DIET CONTROLLED GESTATIONAL DIABETES MELLITUS (GDM), ANTEPARTUM (HCC): ICD-10-CM

## 2024-04-11 RX ORDER — BLOOD SUGAR DIAGNOSTIC
STRIP MISCELLANEOUS
Qty: 150 STRIP | Refills: 3 | Status: SHIPPED | OUTPATIENT
Start: 2024-04-11 | End: 2024-04-24

## 2024-04-11 RX ORDER — LANCETS 30 GAUGE
1 EACH MISCELLANEOUS 4 TIMES DAILY
Qty: 200 EACH | Refills: 3 | Status: SHIPPED | OUTPATIENT
Start: 2024-04-11 | End: 2024-04-24

## 2024-04-11 NOTE — TELEPHONE ENCOUNTER
Resent prescription for diabetes testing supplies with 4x/day testing. Will call pharmacy to verify.

## 2024-04-11 NOTE — TELEPHONE ENCOUNTER
Call from [facility] stating sig on test strips is not allowed by insurance, needs to say how many times pt is testing daily.  Can you please resend script?

## 2024-04-16 ENCOUNTER — OFFICE VISIT (OUTPATIENT)
Dept: PERINATAL CARE | Facility: HOSPITAL | Age: 34
End: 2024-04-16
Attending: STUDENT IN AN ORGANIZED HEALTH CARE EDUCATION/TRAINING PROGRAM
Payer: COMMERCIAL

## 2024-04-16 ENCOUNTER — ULTRASOUND ENCOUNTER (OUTPATIENT)
Dept: PERINATAL CARE | Facility: HOSPITAL | Age: 34
End: 2024-04-16
Attending: OBSTETRICS & GYNECOLOGY
Payer: COMMERCIAL

## 2024-04-16 VITALS
BODY MASS INDEX: 34.96 KG/M2 | HEIGHT: 62 IN | WEIGHT: 190 LBS | SYSTOLIC BLOOD PRESSURE: 114 MMHG | DIASTOLIC BLOOD PRESSURE: 71 MMHG | HEART RATE: 91 BPM

## 2024-04-16 DIAGNOSIS — Z86.2 HISTORY OF ANEMIA: ICD-10-CM

## 2024-04-16 DIAGNOSIS — O09.522 MULTIGRAVIDA OF ADVANCED MATERNAL AGE IN SECOND TRIMESTER (HCC): Primary | ICD-10-CM

## 2024-04-16 DIAGNOSIS — O09.522 MULTIGRAVIDA OF ADVANCED MATERNAL AGE IN SECOND TRIMESTER (HCC): ICD-10-CM

## 2024-04-16 DIAGNOSIS — O99.210 OBESITY AFFECTING PREGNANCY, ANTEPARTUM, UNSPECIFIED OBESITY TYPE (HCC): ICD-10-CM

## 2024-04-16 DIAGNOSIS — E66.9 OBESITY: ICD-10-CM

## 2024-04-16 DIAGNOSIS — O09.529 ANTEPARTUM MULTIGRAVIDA OF ADVANCED MATERNAL AGE (HCC): ICD-10-CM

## 2024-04-16 DIAGNOSIS — O24.419: ICD-10-CM

## 2024-04-16 PROCEDURE — 76811 OB US DETAILED SNGL FETUS: CPT | Performed by: OBSTETRICS & GYNECOLOGY

## 2024-04-24 ENCOUNTER — TELEPHONE (OUTPATIENT)
Dept: ENDOCRINOLOGY CLINIC | Facility: CLINIC | Age: 34
End: 2024-04-24

## 2024-04-24 ENCOUNTER — NURSE ONLY (OUTPATIENT)
Dept: ENDOCRINOLOGY CLINIC | Facility: CLINIC | Age: 34
End: 2024-04-24
Payer: COMMERCIAL

## 2024-04-24 VITALS — WEIGHT: 189 LBS | BODY MASS INDEX: 35 KG/M2

## 2024-04-24 DIAGNOSIS — O24.410 DIET CONTROLLED GESTATIONAL DIABETES MELLITUS (GDM), ANTEPARTUM (HCC): Primary | ICD-10-CM

## 2024-04-24 DIAGNOSIS — O24.410 DIET CONTROLLED GESTATIONAL DIABETES MELLITUS (GDM), ANTEPARTUM (HCC): ICD-10-CM

## 2024-04-24 PROCEDURE — G0108 DIAB MANAGE TRN  PER INDIV: HCPCS | Performed by: DIETITIAN, REGISTERED

## 2024-04-24 RX ORDER — BLOOD SUGAR DIAGNOSTIC
STRIP MISCELLANEOUS
Qty: 400 STRIP | Refills: 2 | Status: SHIPPED | OUTPATIENT
Start: 2024-04-24

## 2024-04-24 RX ORDER — BLOOD-GLUCOSE METER
1 EACH MISCELLANEOUS AS DIRECTED
Qty: 1 KIT | Refills: 0 | Status: SHIPPED | OUTPATIENT
Start: 2024-04-24

## 2024-04-24 RX ORDER — LANCETS 30 GAUGE
1 EACH MISCELLANEOUS 4 TIMES DAILY
Qty: 400 EACH | Refills: 2 | Status: SHIPPED | OUTPATIENT
Start: 2024-04-24

## 2024-04-24 NOTE — PROGRESS NOTES
Glendy Koo  :1990 was seen for Gestational Diabetes Counseling: Individual/Group    Date: 2024  Referring Provider: Dr. ABDIRASHID Ashley  Start time: 1:30pm End time: 2:30pm    Assessment:Wt 189 lb   LMP 2023   BMI 34.57 kg/m²     : 4 (2 miscarriages )  Para: 1  SUELLEN: 24 ; 23 wks gestation     History of GMD:  YES    Not working   Obtained usual diet history: Eats 2-3 meals/day   B: 9am-10am 2 boiled eggs   No snack  L: 2pm noodles (ramen) or soup ( cream of broccoli cheddar or salad w/tomatoes , feta  cheese, chick peas & red onions w/whole grain bread   Snack: string cheese , apple or blackberries   D: 8:30pm chx, grd beef & veal ;since dx cut out rice 3-4 times if not rice pasta, not eating potatoes or peas often,1x/wk cauliflower as a snack , carrots, broccoli & tomatoes at HS   Drinks water , green tea but no milk   Eats out 1x/wk cooked sushi or mediteranean food     Reg exercise: every day 20-30 min.       Education:     GDM Overview:  Reviewed gestational diabetes as diagnosis including target blood glucose values.  Benefits, risks, and management options for improving/maintaining glucose control to mother/baby discussed.    Healthy Eating:  Discussed nutrition concepts for pregnancy/healthy eating and effects of food on BG value.  Timing of meals; what is a carbohydrate, protein, fat.  Taught: Carb counting, label reading, meal planning.  Suggested Calorie Level: 2000    Being Active:  Benefits and effects of activity on BG discussed.  Reviewed types of recommended activity, duration, precautions, and when to call MD.    Monitoring:  Instructed on how to use glucose monitor/proper lancet disposal. Testing schedules are:   Fastin-95 mg/dL, Call MD is >105 md/dL twice in 1 week   2 Hour Post Prandial:  120 md/dL, Call MD if >140 md/dL twice in 1 week.    Instructed /demonstrated ability to perform blood glucose testing on: Onetouch VerioFlex  Supplies sent to  pharmacy  Discussed monitoring ketones    Reducing Risk:  Effects of elevated blood glucose on mother/baby reviewed.  Discussed management (hyperglycemia, hypoglycemia, sick day,  other) and when to call provider.  Post pregnancy management/prevention of Type 2 DM, and increased risk of having diabetes later in life reviewed.    Training Tools Provided:  Edward/Hestand Diabetes and Pregnancy: A guide to self management  BG Log Sheets    Recommendations:      1. Follow recommended meal plan.   2. Begin testing fasting glucose and 2 hour after meals   3. Bring glucose / food log to next MD office visit.   4. Encouraged activity if no restrictions.   5. Encouraged Glendy to call diabetes center with any questions or concerns.   6. If fasting glucose is above 105 mg/dL or after meal above 140 mg/dL for 2 days in a row please call your OB   7. Follow-up in 2 wks     Patient verbalized understanding and has no further questions at this time.    Lisa Hopson RN, [facility]

## 2024-04-25 ENCOUNTER — ROUTINE PRENATAL (OUTPATIENT)
Facility: CLINIC | Age: 34
End: 2024-04-25
Payer: COMMERCIAL

## 2024-04-25 VITALS
WEIGHT: 188 LBS | SYSTOLIC BLOOD PRESSURE: 136 MMHG | DIASTOLIC BLOOD PRESSURE: 78 MMHG | HEIGHT: 62 IN | HEART RATE: 100 BPM | BODY MASS INDEX: 34.6 KG/M2

## 2024-04-25 DIAGNOSIS — Z13.0 SCREENING FOR DEFICIENCY ANEMIA: Primary | ICD-10-CM

## 2024-04-25 NOTE — PROGRESS NOTES
FELISHA 21w4d    She is doing well, no complaints   -2nd tri CBC discussed - order placed        EDC by LMP c/w 9w4d US  -NIPT done - resulted negative but \"insufficient specimen\" for result on X chromosome, could not rule out or rule in monosomy X. Other aneuploidies were low risk. Offered repeat specimen - pt declines  -states she had carrier screen with first pregnancy - negative     Obesity  -BMI 34 at initial OB visit  - limit weight gain tp 11-20 lbs  - L2U - normal   -growth US every 4 wks in 3rd tri - next appointment 6/11  -NST 36 weeks       2. AMA  - as above  - advised to start ASA 1.5 tabs daily    3. GDM  -is checking bs - fastings 80's-90's, postprandial 115-120's, highest 125  -if needs meds for glucose control-weekly NSTs at 32 wks and twice weekly NSTs at 34 wks  -Delivery at 39-39w6d for well controlled DM        4. .UTI dx at 1st OB  - resistant to ampicillin, rxed augmentin, patient did not take abx because she has no symptoms  - encouraged to complete course of antibiotics   -retest urine at next visit

## 2024-05-13 ENCOUNTER — TELEPHONE (OUTPATIENT)
Facility: CLINIC | Age: 34
End: 2024-05-13

## 2024-06-11 ENCOUNTER — ULTRASOUND ENCOUNTER (OUTPATIENT)
Dept: PERINATAL CARE | Facility: HOSPITAL | Age: 34
End: 2024-06-11
Attending: OBSTETRICS & GYNECOLOGY
Payer: COMMERCIAL

## 2024-06-11 VITALS
BODY MASS INDEX: 34.6 KG/M2 | SYSTOLIC BLOOD PRESSURE: 122 MMHG | DIASTOLIC BLOOD PRESSURE: 73 MMHG | HEART RATE: 111 BPM | WEIGHT: 188 LBS | HEIGHT: 62 IN

## 2024-06-11 DIAGNOSIS — O09.529 ANTEPARTUM MULTIGRAVIDA OF ADVANCED MATERNAL AGE (HCC): ICD-10-CM

## 2024-06-11 DIAGNOSIS — O09.523 MULTIGRAVIDA OF ADVANCED MATERNAL AGE IN THIRD TRIMESTER (HCC): ICD-10-CM

## 2024-06-11 DIAGNOSIS — E66.9 OBESITY: ICD-10-CM

## 2024-06-11 DIAGNOSIS — O24.410 DIET CONTROLLED GESTATIONAL DIABETES MELLITUS (GDM) IN THIRD TRIMESTER (HCC): Primary | ICD-10-CM

## 2024-06-11 DIAGNOSIS — O99.213 OTHER OBESITY DUE TO EXCESS CALORIES AFFECTING PREGNANCY IN THIRD TRIMESTER (HCC): ICD-10-CM

## 2024-06-11 DIAGNOSIS — O24.419: ICD-10-CM

## 2024-06-11 DIAGNOSIS — E66.09 OTHER OBESITY DUE TO EXCESS CALORIES AFFECTING PREGNANCY IN THIRD TRIMESTER (HCC): ICD-10-CM

## 2024-06-11 PROCEDURE — 76816 OB US FOLLOW-UP PER FETUS: CPT | Performed by: OBSTETRICS & GYNECOLOGY

## 2024-06-27 ENCOUNTER — ROUTINE PRENATAL (OUTPATIENT)
Facility: CLINIC | Age: 34
End: 2024-06-27

## 2024-06-27 VITALS
HEIGHT: 62 IN | HEART RATE: 107 BPM | WEIGHT: 191 LBS | DIASTOLIC BLOOD PRESSURE: 62 MMHG | BODY MASS INDEX: 35.15 KG/M2 | SYSTOLIC BLOOD PRESSURE: 102 MMHG

## 2024-06-27 DIAGNOSIS — Z3A.30 30 WEEKS GESTATION OF PREGNANCY (HCC): Primary | ICD-10-CM

## 2024-06-27 PROCEDURE — 87077 CULTURE AEROBIC IDENTIFY: CPT

## 2024-06-27 PROCEDURE — 87186 SC STD MICRODIL/AGAR DIL: CPT

## 2024-06-27 PROCEDURE — 87086 URINE CULTURE/COLONY COUNT: CPT

## 2024-06-27 NOTE — PROGRESS NOTES
FELISHA 30w4d    She is doing well, no complaints  -Tdap discussed, wants to think about it, ask at next visit  -her fastings are <90 and postprandial <120, had one 125   -2nd tri CBC- reminded pt  -3rd tri HIV & syphilis screen discussed and ordered     EDC by LMP c/w 9w4d US  -NIPT done - resulted negative but \"insufficient specimen\" for result on X chromosome, could not rule out or rule in monosomy X. Other aneuploidies were low risk. Offered repeat specimen - pt declines  -states she had carrier screen with first pregnancy - negative     Obesity  -BMI 34 at initial OB visit  - limit weight gain tp 11-20 lbs  - L2U - normal   -growth US every 4 wks in 3rd tri - next appt 7/10  -NST 36 weeks        2. AMA  - as above  - advised to start ASA 1.5 tabs daily     3. GDM  -if needs meds for glucose control-weekly NSTs at 32 wks and twice weekly NSTs at 34 wks  -Delivery at 39-39w6d for well controlled DM

## 2024-07-01 RX ORDER — NITROFURANTOIN 25; 75 MG/1; MG/1
100 CAPSULE ORAL 2 TIMES DAILY
Qty: 14 CAPSULE | Refills: 0 | Status: SHIPPED | OUTPATIENT
Start: 2024-07-01 | End: 2024-07-08

## 2024-07-08 ENCOUNTER — LAB ENCOUNTER (OUTPATIENT)
Dept: LAB | Age: 34
End: 2024-07-08
Payer: COMMERCIAL

## 2024-07-08 DIAGNOSIS — Z3A.30 30 WEEKS GESTATION OF PREGNANCY (HCC): ICD-10-CM

## 2024-07-08 DIAGNOSIS — Z13.0 SCREENING FOR DEFICIENCY ANEMIA: ICD-10-CM

## 2024-07-08 LAB
BASOPHILS # BLD AUTO: 0.03 X10(3) UL (ref 0–0.2)
BASOPHILS NFR BLD AUTO: 0.4 %
DEPRECATED RDW RBC AUTO: 39 FL (ref 35.1–46.3)
EOSINOPHIL # BLD AUTO: 0.05 X10(3) UL (ref 0–0.7)
EOSINOPHIL NFR BLD AUTO: 0.6 %
ERYTHROCYTE [DISTWIDTH] IN BLOOD BY AUTOMATED COUNT: 15.5 % (ref 11–15)
HCT VFR BLD AUTO: 34 %
HGB BLD-MCNC: 10.2 G/DL
IMM GRANULOCYTES # BLD AUTO: 0.11 X10(3) UL (ref 0–1)
IMM GRANULOCYTES NFR BLD: 1.4 %
LYMPHOCYTES # BLD AUTO: 1.7 X10(3) UL (ref 1–4)
LYMPHOCYTES NFR BLD AUTO: 21 %
MCH RBC QN AUTO: 21.5 PG (ref 26–34)
MCHC RBC AUTO-ENTMCNC: 30 G/DL (ref 31–37)
MCV RBC AUTO: 71.6 FL
MONOCYTES # BLD AUTO: 0.51 X10(3) UL (ref 0.1–1)
MONOCYTES NFR BLD AUTO: 6.3 %
NEUTROPHILS # BLD AUTO: 5.7 X10 (3) UL (ref 1.5–7.7)
NEUTROPHILS # BLD AUTO: 5.7 X10(3) UL (ref 1.5–7.7)
NEUTROPHILS NFR BLD AUTO: 70.3 %
PLATELET # BLD AUTO: 375 10(3)UL (ref 150–450)
RBC # BLD AUTO: 4.75 X10(6)UL
T PALLIDUM AB SER QL IA: NONREACTIVE
WBC # BLD AUTO: 8.1 X10(3) UL (ref 4–11)

## 2024-07-08 PROCEDURE — 85025 COMPLETE CBC W/AUTO DIFF WBC: CPT

## 2024-07-08 PROCEDURE — 87389 HIV-1 AG W/HIV-1&-2 AB AG IA: CPT

## 2024-07-08 PROCEDURE — 86780 TREPONEMA PALLIDUM: CPT

## 2024-07-08 PROCEDURE — 36415 COLL VENOUS BLD VENIPUNCTURE: CPT

## 2024-07-10 ENCOUNTER — OFFICE VISIT (OUTPATIENT)
Dept: PERINATAL CARE | Facility: HOSPITAL | Age: 34
End: 2024-07-10
Attending: OBSTETRICS & GYNECOLOGY
Payer: COMMERCIAL

## 2024-07-10 VITALS
WEIGHT: 190 LBS | SYSTOLIC BLOOD PRESSURE: 117 MMHG | DIASTOLIC BLOOD PRESSURE: 70 MMHG | HEIGHT: 62 IN | BODY MASS INDEX: 34.96 KG/M2 | HEART RATE: 110 BPM

## 2024-07-10 DIAGNOSIS — O24.419 GESTATIONAL DIABETES MELLITUS (GDM), ANTEPARTUM, GESTATIONAL DIABETES METHOD OF CONTROL UNSPECIFIED (HCC): Primary | ICD-10-CM

## 2024-07-10 DIAGNOSIS — O99.213 OTHER OBESITY DUE TO EXCESS CALORIES AFFECTING PREGNANCY IN THIRD TRIMESTER (HCC): ICD-10-CM

## 2024-07-10 DIAGNOSIS — O09.529 ANTEPARTUM MULTIGRAVIDA OF ADVANCED MATERNAL AGE (HCC): ICD-10-CM

## 2024-07-10 DIAGNOSIS — E66.09 OTHER OBESITY DUE TO EXCESS CALORIES AFFECTING PREGNANCY IN THIRD TRIMESTER (HCC): ICD-10-CM

## 2024-07-10 DIAGNOSIS — E66.9 OBESITY: ICD-10-CM

## 2024-07-10 DIAGNOSIS — O24.419: Primary | ICD-10-CM

## 2024-07-10 DIAGNOSIS — O24.419: ICD-10-CM

## 2024-07-10 PROCEDURE — 76819 FETAL BIOPHYS PROFIL W/O NST: CPT

## 2024-07-10 PROCEDURE — 76816 OB US FOLLOW-UP PER FETUS: CPT | Performed by: OBSTETRICS & GYNECOLOGY

## 2024-07-10 RX ORDER — FERROUS SULFATE 325(65) MG
325 TABLET, DELAYED RELEASE (ENTERIC COATED) ORAL
COMMUNITY

## 2024-07-10 NOTE — PROGRESS NOTES
Outpatient Maternal-Fetal Medicine Consultation    Dear Dr. Ashley    Thank you for requesting ultrasound evaluation and maternal fetal medicine consultation on your patient Glendy Koo.  As you are aware she is a 34 year old female  with a singletonpregnancy and an Estimated Date of Delivery: 24.  A maternal-fetal medicine follow-up is today.  Her prenatal records and labs were reviewed.    Glucoses controlled  HISTORY  # 1 - Date: 2019, Sex: None, Weight: None, GA: 7w0d, Type: Spontaneous , Apgar1: None, Apgar5: None, Living: Fetal Demise, Birth Comments: None    # 2 - Date: 20, Sex: Male, Weight: 6 lb 9.8 oz (3 kg), GA: 38w2d, Type: Normal spontaneous vaginal delivery, Apgar1: 8, Apgar5: 9, Living: Living, Birth Comments: None    # 3 - Date: 2023, Sex: None, Weight: None, GA: None, Type: None, Apgar1: None, Apgar5: None, Living: None, Birth Comments: None    # 4 - Date: None, Sex: None, Weight: None, GA: None, Type: None, Apgar1: None, Apgar5: None, Living: None, Birth Comments: None      Past Medical History  The patient  has a past medical history of Amenorrhea (2023), Anemia, Blood disorder, Gestational diabetes (HCC), Pap smear for cervical cancer screening (01/15/2019), and Visual impairment.        Past Surgical History  The patient  has no past surgical history on file.    Family History  The patient She indicated that her mother is alive. She indicated that her father is alive. She indicated that the status of her maternal grandmother is unknown. She indicated that the status of her paternal grandmother is unknown.      Medications:   Current Outpatient Medications:     ferrous sulfate 325 (65 FE) MG Oral Tab EC, Take 1 tablet (325 mg total) by mouth 3 (three) times a week., Disp: , Rfl:     aspirin 81 MG Oral Tab EC, Take 2 tablets (162 mg total) by mouth daily., Disp: , Rfl:     prenatal vitamin with DHA 27-0.8-228 MG Oral Cap, Take 1 capsule by mouth  daily., Disp: , Rfl:     Blood Glucose Monitoring Suppl (ONETOUCH VERIO FLEX SYSTEM) w/Device Does not apply Kit, 1 each As Directed., Disp: 1 kit, Rfl: 0    Glucose Blood (ONETOUCH VERIO) In Vitro Strip, Test blood sugar 4 times daily, Disp: 400 strip, Rfl: 2    OneTouch Delica Lancets 30G Does not apply Misc, 1 each 4 (four) times daily., Disp: 400 each, Rfl: 2  Allergies: No Known Allergies    PHYSICAL EXAMINATION:  /70 (BP Location: Right arm, Patient Position: Sitting, Cuff Size: adult)   Pulse 110   Ht 5' 2\" (1.575 m)   Wt 190 lb (86.2 kg)   LMP 11/26/2023   BMI 34.75 kg/m²   General: alert and oriented,no acute distress  Abdomen: gravid, soft, non-tender      OBSTETRIC ULTRASOUND  The patient had a growth ultrasound today which revealed size consistent with dates.    Ultrasound Findings:  Single IUP in cephalic presentation.    Placenta is anterior.   A 3 vessel cord is noted.  Cardiac activity is present at 144 bpm  EFW 2093 g ( 4 lb 10 oz); 55%.    AURELIA is  13 cm.  MVP is 7.1 cm  BPP is 8/8.     The fetal measurements are consistent with established EDC. No gross ultrasound evidence of structural abnormalities are seen today. The patient understands that ultrasound cannot rule out all structural and chromosomal abnormalities.   See PACS/Imaging Tab For Complete Ultrasound Report  I interpreted the results and reviewed them with the patient.    DISCUSSION  During her visit we discussed and reviewed the following issues:  ADVANCED MATERNAL AGE    Background  I reviewed with the patient that pregnancies in women of advanced maternal age (35 or older at delivery) are associated with elevated risks.  Specifically, there is a higher rate of:    Fetal malformations  Preeclampsia  Gestational diabetes  Intrauterine fetal death  Please see previous MFM discussion.  The patient has already obtained a low-risk  NPIT result and was appropriately reassured.     GESTATIONAL DIABETES    Please see previous MFM  discussion  Medical Regimen Recommendation:   Continue ADA diet  Weekly OB review of capillary blood glucose values  Let MFM know if you want our service to manage patient's GDM      IMPRESSION:  IUP at 32w3d   AMA: low-risk cell free fetal DNA for trisomy 13, 18,21, declined invasive testing  No result for monosomy X.  6.2% fetal fraction.  Quality of data is not sufficient to give result  Obesity  GDM A1 -on ADA diet, managed by OB    RECOMMENDATIONS:  Continue care with Dr. Ashley  Continue four times daily capillary blood glucose assessments (fasting and 2 hour postprandial)  Continue ADA diet  Weekly OB review of capillary blood glucose values  Let MFM know if you want our service to manage patient's GDM  Follow-up growth ultrasound every 4 weeks in the third trimester  Weekly NSTs at 36 weeks  If medications are required for glucose control: Weekly NSTs at 32 weeks; twice weekly NST's at 34 weeks      Thank you for allowing me to participate in the care of your patient.  Please do not hesitate to contact me if additional questions or concerns arise.      Pushpa Rodriguez MD     20 minutes spent in review of records, patient consultation, documentation and coordination of care.  The relevant clinical matter(s) are summarized above.     Note to patient and family  The 21st Century Cures Act makes medical notes available to patients in the interest of transparency.  However, please be advised that this is a medical document.  It is intended as [id number] communication.  It is written and medical language may contain abbreviations or verbiage that are technical and unfamiliar.  It may appear blunt or direct.  Medical documents are intended to carry relevant information, facts as evident, and the clinical opinion of the practitioner.

## 2024-07-11 ENCOUNTER — ROUTINE PRENATAL (OUTPATIENT)
Facility: CLINIC | Age: 34
End: 2024-07-11
Payer: COMMERCIAL

## 2024-07-11 VITALS
BODY MASS INDEX: 35.15 KG/M2 | SYSTOLIC BLOOD PRESSURE: 102 MMHG | DIASTOLIC BLOOD PRESSURE: 66 MMHG | HEART RATE: 102 BPM | WEIGHT: 191 LBS | HEIGHT: 62 IN

## 2024-07-11 DIAGNOSIS — Z3A.32 32 WEEKS GESTATION OF PREGNANCY (HCC): ICD-10-CM

## 2024-07-11 DIAGNOSIS — Z34.83 PRENATAL CARE, SUBSEQUENT PREGNANCY IN THIRD TRIMESTER (HCC): Primary | ICD-10-CM

## 2024-07-24 ENCOUNTER — ROUTINE PRENATAL (OUTPATIENT)
Facility: CLINIC | Age: 34
End: 2024-07-24
Payer: COMMERCIAL

## 2024-07-24 VITALS
SYSTOLIC BLOOD PRESSURE: 115 MMHG | WEIGHT: 196.19 LBS | DIASTOLIC BLOOD PRESSURE: 62 MMHG | HEIGHT: 62 IN | HEART RATE: 100 BPM | BODY MASS INDEX: 36.1 KG/M2

## 2024-07-24 DIAGNOSIS — O99.013 ANEMIA DURING PREGNANCY IN THIRD TRIMESTER (HCC): Primary | ICD-10-CM

## 2024-08-03 ENCOUNTER — ROUTINE PRENATAL (OUTPATIENT)
Facility: CLINIC | Age: 34
End: 2024-08-03
Payer: COMMERCIAL

## 2024-08-03 VITALS
BODY MASS INDEX: 35.4 KG/M2 | HEART RATE: 77 BPM | SYSTOLIC BLOOD PRESSURE: 122 MMHG | HEIGHT: 62 IN | WEIGHT: 192.38 LBS | DIASTOLIC BLOOD PRESSURE: 76 MMHG

## 2024-08-03 DIAGNOSIS — Z34.83 PRENATAL CARE, SUBSEQUENT PREGNANCY IN THIRD TRIMESTER (HCC): Primary | ICD-10-CM

## 2024-08-03 DIAGNOSIS — Z3A.35 35 WEEKS GESTATION OF PREGNANCY (HCC): ICD-10-CM

## 2024-08-03 PROCEDURE — 87081 CULTURE SCREEN ONLY: CPT | Performed by: OBSTETRICS & GYNECOLOGY

## 2024-08-03 PROCEDURE — 87150 DNA/RNA AMPLIFIED PROBE: CPT | Performed by: OBSTETRICS & GYNECOLOGY

## 2024-08-03 NOTE — PROGRESS NOTES
Patient has no complaints  GBS done, declined SVE    - TDAP declined  - HIV, t pal done    IOL scheduled 8/24/24 7:30 am per EP    Fastings are less than 88 and postprandial less than 120      EDC by LMP c/w 9w4d US  -NIPT done - resulted negative but \"insufficient specimen\" for result on X chromosome, could not rule out or rule in monosomy X. Other aneuploidies were low risk. Offered repeat specimen - pt declines  -states she had carrier screen with first pregnancy - negative     Obesity  -BMI 34 at initial OB visit  - limit weight gain tp 11-20 lbs  - L2U - normal   -growth US every 4 wks in 3rd tri -  7/10 normal growth   - next appt 8/7/24  -NST 36 weeks        2. AMA  - as above  - advised to start ASA 1.5 tabs daily - takes it sporadically      3. GDM  -if needs meds for glucose control-weekly NSTs at 32 wks and twice weekly NSTs at 36 wks  -Delivery at 39-39w6d for well controlled DM   - well controlled with diet     4. Anemia  -2nd tri CBC 10.2 - has been taking extra iron for two weeks  -will recheck CBC 8/15- ordered

## 2024-08-05 LAB — GROUP B STREP BY PCR FOR PCR OVT: NEGATIVE

## 2024-08-07 ENCOUNTER — ULTRASOUND ENCOUNTER (OUTPATIENT)
Dept: PERINATAL CARE | Facility: HOSPITAL | Age: 34
End: 2024-08-07
Attending: OBSTETRICS & GYNECOLOGY
Payer: COMMERCIAL

## 2024-08-07 ENCOUNTER — ROUTINE PRENATAL (OUTPATIENT)
Facility: CLINIC | Age: 34
End: 2024-08-07
Payer: COMMERCIAL

## 2024-08-07 VITALS
SYSTOLIC BLOOD PRESSURE: 120 MMHG | DIASTOLIC BLOOD PRESSURE: 72 MMHG | HEART RATE: 97 BPM | HEIGHT: 62 IN | BODY MASS INDEX: 36.22 KG/M2 | WEIGHT: 196.81 LBS

## 2024-08-07 VITALS
HEART RATE: 99 BPM | WEIGHT: 197 LBS | BODY MASS INDEX: 36.25 KG/M2 | HEIGHT: 62 IN | SYSTOLIC BLOOD PRESSURE: 116 MMHG | DIASTOLIC BLOOD PRESSURE: 71 MMHG

## 2024-08-07 DIAGNOSIS — O09.523 MULTIGRAVIDA OF ADVANCED MATERNAL AGE IN THIRD TRIMESTER (HCC): ICD-10-CM

## 2024-08-07 DIAGNOSIS — O99.210 SEVERE OBESITY DUE TO EXCESS CALORIES AFFECTING PREGNANCY, ANTEPARTUM (HCC): ICD-10-CM

## 2024-08-07 DIAGNOSIS — Z3A.36 36 WEEKS GESTATION OF PREGNANCY (HCC): ICD-10-CM

## 2024-08-07 DIAGNOSIS — O99.213 OTHER OBESITY DUE TO EXCESS CALORIES AFFECTING PREGNANCY IN THIRD TRIMESTER (HCC): ICD-10-CM

## 2024-08-07 DIAGNOSIS — Z34.83 PRENATAL CARE, SUBSEQUENT PREGNANCY IN THIRD TRIMESTER (HCC): Primary | ICD-10-CM

## 2024-08-07 DIAGNOSIS — E66.01 SEVERE OBESITY DUE TO EXCESS CALORIES AFFECTING PREGNANCY, ANTEPARTUM (HCC): ICD-10-CM

## 2024-08-07 DIAGNOSIS — E66.09 OTHER OBESITY DUE TO EXCESS CALORIES AFFECTING PREGNANCY IN THIRD TRIMESTER (HCC): ICD-10-CM

## 2024-08-07 DIAGNOSIS — O24.419 GDM (GESTATIONAL DIABETES MELLITUS) (HCC): ICD-10-CM

## 2024-08-07 DIAGNOSIS — O09.529 AMA (ADVANCED MATERNAL AGE) MULTIGRAVIDA 35+ (HCC): ICD-10-CM

## 2024-08-07 DIAGNOSIS — E66.9 OBESITY: ICD-10-CM

## 2024-08-07 DIAGNOSIS — O24.410 DIET CONTROLLED GESTATIONAL DIABETES MELLITUS (GDM) IN THIRD TRIMESTER (HCC): Primary | ICD-10-CM

## 2024-08-07 PROCEDURE — 76816 OB US FOLLOW-UP PER FETUS: CPT | Performed by: OBSTETRICS & GYNECOLOGY

## 2024-08-07 PROCEDURE — 59025 FETAL NON-STRESS TEST: CPT | Performed by: OBSTETRICS & GYNECOLOGY

## 2024-08-07 PROCEDURE — 76819 FETAL BIOPHYS PROFIL W/O NST: CPT

## 2024-08-07 NOTE — PROGRESS NOTES
Patient has no complaints  - NST reactive    - TDAP declined  - HIV, t pal done    IOL scheduled 8/24/24 7:30 am per EP - may need to change to cytotec based on vaginal exam    EDC by LMP c/w 9w4d US  -NIPT done - resulted negative but \"insufficient specimen\" for result on X chromosome, could not rule out or rule in monosomy X. Other aneuploidies were low risk. Offered repeat specimen - pt declines  -states she had carrier screen with first pregnancy - negative     Obesity  -BMI 34 at initial OB visit  - limit weight gain tp 11-20 lbs  - L2U - normal   -growth US every 4 wks in 3rd tri -  7/10 normal growth   - next appt 8/7/24  -NST 36 weeks        2. AMA  - as above  - advised to start ASA 1.5 tabs daily - takes it sporadically      3. GDM  -if needs meds for glucose control-weekly NSTs at 32 wks and twice weekly NSTs at 36 wks  -Delivery at 39-39w6d for well controlled DM   - well controlled with diet     4. Anemia  -2nd tri CBC 10.2 - has been taking extra iron   -will recheck CBC 8/15- ordered

## 2024-08-07 NOTE — PROGRESS NOTES
Outpatient Maternal-Fetal Medicine Consultation    Dear Dr. Ashley    Thank you for requesting ultrasound evaluation and maternal fetal medicine consultation on your patient Glendy Koo.  As you are aware she is a 34 year old female  with a singletonpregnancy and an Estimated Date of Delivery: 24.  A maternal-fetal medicine follow-up is today.  Her prenatal records and labs were reviewed.    Glucoses controlled.  Watns to see how baby is today.  HISTORY  # 1 - Date: 2019, Sex: None, Weight: None, GA: 7w0d, Type: Spontaneous , Apgar1: None, Apgar5: None, Living: Fetal Demise, Birth Comments: None    # 2 - Date: 20, Sex: Male, Weight: 6 lb 9.8 oz (3 kg), GA: 38w2d, Type: Normal spontaneous vaginal delivery, Apgar1: 8, Apgar5: 9, Living: Living, Birth Comments: None    # 3 - Date: 2023, Sex: None, Weight: None, GA: None, Type: None, Apgar1: None, Apgar5: None, Living: None, Birth Comments: None    # 4 - Date: None, Sex: None, Weight: None, GA: None, Type: None, Apgar1: None, Apgar5: None, Living: None, Birth Comments: None      Past Medical History  The patient  has a past medical history of Amenorrhea (2023), Anemia, Blood disorder, Gestational diabetes (HCC), Pap smear for cervical cancer screening (01/15/2019), and Visual impairment.        Past Surgical History  The patient  has no past surgical history on file.    Family History  The patient She indicated that her mother is alive. She indicated that her father is alive. She indicated that the status of her maternal grandmother is unknown. She indicated that the status of her paternal grandmother is unknown.      Medications:   Current Outpatient Medications:     ferrous sulfate 325 (65 FE) MG Oral Tab EC, Take 1 tablet (325 mg total) by mouth 3 (three) times a week., Disp: , Rfl:     aspirin 81 MG Oral Tab EC, Take 2 tablets (162 mg total) by mouth daily., Disp: , Rfl:     prenatal vitamin with DHA 27-0.8-228 MG  Oral Cap, Take 1 capsule by mouth daily., Disp: , Rfl:     Blood Glucose Monitoring Suppl (ONETOUCH VERIO FLEX SYSTEM) w/Device Does not apply Kit, 1 each As Directed., Disp: 1 kit, Rfl: 0    Glucose Blood (ONETOUCH VERIO) In Vitro Strip, Test blood sugar 4 times daily, Disp: 400 strip, Rfl: 2    OneTouch Delica Lancets 30G Does not apply Misc, 1 each 4 (four) times daily., Disp: 400 each, Rfl: 2  Allergies: No Known Allergies    PHYSICAL EXAMINATION:  /71 (BP Location: Right arm, Patient Position: Sitting, Cuff Size: large)   Pulse 99   Ht 5' 2\" (1.575 m)   Wt 197 lb (89.4 kg)   LMP 11/26/2023   BMI 36.03 kg/m²   General: alert and oriented,no acute distress  Abdomen: gravid, soft, non-tender      OBSTETRIC ULTRASOUND  The patient had a growth ultrasound today which revealed size consistent with dates.    Ultrasound Findings:  Single IUP in cephalic presentation.    Placenta is anterior.   Cardiac activity is present at 132 bpm  EFW 2986 g ( 6 lb 9 oz); 54%.    AURELIA is  18 cm.  MVP is 7.8 cm  BPP is 8/8.     The fetal measurements are consistent with established EDC. No gross ultrasound evidence of structural abnormalities are seen today. The patient understands that ultrasound cannot rule out all structural and chromosomal abnormalities.   See PACS/Imaging Tab For Complete Ultrasound Report  I interpreted the results and reviewed them with the patient.    DISCUSSION  During her visit we discussed and reviewed the following issues:  ADVANCED MATERNAL AGE    Background  I reviewed with the patient that pregnancies in women of advanced maternal age (35 or older at delivery) are associated with elevated risks.  Specifically, there is a higher rate of:    Fetal malformations  Preeclampsia  Gestational diabetes  Intrauterine fetal death  Please see previous MFM discussion.  The patient has already obtained a low-risk  NPIT result and was appropriately reassured.     GESTATIONAL DIABETES    Please see previous  MFM discussion  Medical Regimen Recommendation:   Continue ADA diet  Weekly OB review of capillary blood glucose values  Let MFM know if you want our service to manage patient's GDM    Induction scheduled 8/24/24  IMPRESSION:  IUP at 36w3d   AMA: low-risk cell free fetal DNA for trisomy 13, 18,21, declined invasive testing  No result for monosomy X.  6.2% fetal fraction.  Quality of data is not sufficient to give result  Obesity  GDM A1 -on ADA diet, managed by OB    RECOMMENDATIONS:  Continue care with Dr. Ashley  Continue four times daily capillary blood glucose assessments (fasting and 2 hour postprandial)  Continue ADA diet  Weekly OB review of capillary blood glucose values  Let MFM know if you want our service to manage patient's GDM  Weekly NSTs at 36 weeks  If medications are required for glucose control: Weekly NSTs at 32 weeks; twice weekly NST's at 34 weeks      Thank you for allowing me to participate in the care of your patient.  Please do not hesitate to contact me if additional questions or concerns arise.      Pushpa Rodriguez MD     20 minutes spent in review of records, patient consultation, documentation and coordination of care.  The relevant clinical matter(s) are summarized above.     Note to patient and family  The 21st Century Cures Act makes medical notes available to patients in the interest of transparency.  However, please be advised that this is a medical document.  It is intended as [id number] communication.  It is written and medical language may contain abbreviations or verbiage that are technical and unfamiliar.  It may appear blunt or direct.  Medical documents are intended to carry relevant information, facts as evident, and the clinical opinion of the practitioner.

## 2024-08-07 NOTE — PROGRESS NOTES
Pt here for Growth Ultrasound  +fm noted per patient  Pt denies complaints   Pt GDM--diet controlled--OB managing

## 2024-08-09 ENCOUNTER — TELEPHONE (OUTPATIENT)
Facility: CLINIC | Age: 34
End: 2024-08-09

## 2024-08-09 ENCOUNTER — TELEPHONE (OUTPATIENT)
Dept: OBGYN UNIT | Facility: HOSPITAL | Age: 34
End: 2024-08-09

## 2024-08-09 NOTE — TELEPHONE ENCOUNTER
Breast Pump order was received from [facility].  Order form was placed in Dr. Deandra Parr's bin for signature.

## 2024-08-16 ENCOUNTER — TELEPHONE (OUTPATIENT)
Facility: CLINIC | Age: 34
End: 2024-08-16

## 2024-08-16 NOTE — TELEPHONE ENCOUNTER
Breast Pump order was received from [facility].  Order form was placed in Dr. Deandra Parr's bin for signature.

## 2024-08-17 ENCOUNTER — APPOINTMENT (OUTPATIENT)
Dept: ULTRASOUND IMAGING | Facility: HOSPITAL | Age: 34
End: 2024-08-17
Attending: OBSTETRICS & GYNECOLOGY
Payer: COMMERCIAL

## 2024-08-17 ENCOUNTER — ROUTINE PRENATAL (OUTPATIENT)
Facility: CLINIC | Age: 34
End: 2024-08-17
Payer: COMMERCIAL

## 2024-08-17 ENCOUNTER — HOSPITAL ENCOUNTER (OUTPATIENT)
Facility: HOSPITAL | Age: 34
Discharge: HOME | End: 2024-08-17
Attending: OBSTETRICS & GYNECOLOGY | Admitting: OBSTETRICS & GYNECOLOGY
Payer: COMMERCIAL

## 2024-08-17 VITALS
BODY MASS INDEX: 36.32 KG/M2 | SYSTOLIC BLOOD PRESSURE: 122 MMHG | HEIGHT: 62 IN | HEART RATE: 90 BPM | WEIGHT: 197.38 LBS | DIASTOLIC BLOOD PRESSURE: 70 MMHG

## 2024-08-17 VITALS
BODY MASS INDEX: 36.25 KG/M2 | TEMPERATURE: 99 F | HEIGHT: 62 IN | DIASTOLIC BLOOD PRESSURE: 61 MMHG | HEART RATE: 88 BPM | WEIGHT: 197 LBS | RESPIRATION RATE: 18 BRPM | SYSTOLIC BLOOD PRESSURE: 120 MMHG

## 2024-08-17 DIAGNOSIS — O09.523 MULTIGRAVIDA OF ADVANCED MATERNAL AGE IN THIRD TRIMESTER (HCC): Primary | ICD-10-CM

## 2024-08-17 PROBLEM — O28.8 NST (NON-STRESS TEST) NONREACTIVE: Status: ACTIVE | Noted: 2024-08-17

## 2024-08-17 PROCEDURE — 59025 FETAL NON-STRESS TEST: CPT | Performed by: OBSTETRICS & GYNECOLOGY

## 2024-08-17 PROCEDURE — 76818 FETAL BIOPHYS PROFILE W/NST: CPT | Performed by: OBSTETRICS & GYNECOLOGY

## 2024-08-17 PROCEDURE — 76819 FETAL BIOPHYS PROFIL W/O NST: CPT | Performed by: OBSTETRICS & GYNECOLOGY

## 2024-08-17 PROCEDURE — 59025 FETAL NON-STRESS TEST: CPT

## 2024-08-17 NOTE — PROGRESS NOTES
NST Reactive  moderate variability, baseline 140, + accels, no decels  no contractions      Continue NST weekly   BPP 8/8    Reviewed BPP and fetal heart rate monitoring.  Reviewed with MFM, Dr. Wyatt, regarding initially nonreactive NST but reassuring BPP at term.  Overall fetal heart rate monitoring is reassuring.  Therefore, patient discharged to home in stable condition and follow-up as an outpatient.    Jing Mullen MD   [facility] - OBGYN

## 2024-08-17 NOTE — PROGRESS NOTES
FELISHA 37w6d    She is doing well, no complaints     NST non reactive, min to mod variability, no accels. Sent to L&D for further monitoring     IOL scheduled 8/24/24 7:30 am per EP - may need to change to cytotec based on vaginal exam     EDC by LMP c/w 9w4d US  -NIPT done - resulted negative but \"insufficient specimen\" for result on X chromosome, could not rule out or rule in monosomy X. Other aneuploidies were low risk. Offered repeat specimen - pt declines  -states she had carrier screen with first pregnancy - negative  -Tdap declined  -3rd tri HIV and T pal done      Obesity  -BMI 34 at initial OB visit  - limit weight gain tp 11-20 lbs  - L2U - normal   -growth US every 4 wks in 3rd tri -  7/10 normal growth   - next appt 8/7/24  -NST 36 weeks        2. AMA  - as above  - advised to start ASA 1.5 tabs daily - takes it sporadically      3. GDM  -if needs meds for glucose control-weekly NSTs at 32 wks and twice weekly NSTs at 36 wks  -Delivery at 39-39w6d for well controlled DM   - well controlled with diet      4. Anemia  -2nd tri CBC 10.2 - has been taking extra iron   -will recheck CBC 8/15- ordered - reminded pt    Minda Hall RN  Outcome: Progressing

## 2024-08-17 NOTE — NST
Nonstress Test   Patient: Glendy Koo    Gestation: 37w6d    NST:       Variability: Moderate           Accelerations: Yes           Decelerations: None            Baseline: 145 BPM           Uterine Irritability: No           Contractions: Not present                                        Acoustic Stimulator: No           Nonstress Test Interpretation: Reactive                                 Additional Comments Comments: Reviewed by Dr. Mullen

## 2024-08-17 NOTE — PROGRESS NOTES
Pt is a 34 year old female admitted to TRG[facility]A.     Chief Complaint   Patient presents with    Non-stress Test     Patient sent from the office for non-reactive NST.  + FM.  Denies LOF, bleeding or UC.      Pt is  37w6d intra-uterine pregnancy.  History obtained, consents signed. Oriented to room, staff, and plan of care.

## 2024-08-23 ENCOUNTER — ANESTHESIA (OUTPATIENT)
Dept: OBGYN UNIT | Facility: HOSPITAL | Age: 34
End: 2024-08-23
Payer: COMMERCIAL

## 2024-08-23 ENCOUNTER — HOSPITAL ENCOUNTER (INPATIENT)
Facility: HOSPITAL | Age: 34
LOS: 2 days | Discharge: HOME | End: 2024-08-25
Attending: STUDENT IN AN ORGANIZED HEALTH CARE EDUCATION/TRAINING PROGRAM | Admitting: STUDENT IN AN ORGANIZED HEALTH CARE EDUCATION/TRAINING PROGRAM
Payer: COMMERCIAL

## 2024-08-23 ENCOUNTER — TELEPHONE (OUTPATIENT)
Facility: CLINIC | Age: 34
End: 2024-08-23

## 2024-08-23 ENCOUNTER — ANESTHESIA EVENT (OUTPATIENT)
Dept: OBGYN UNIT | Facility: HOSPITAL | Age: 34
End: 2024-08-23
Payer: COMMERCIAL

## 2024-08-23 LAB
ANTIBODY SCREEN: NEGATIVE
BASOPHILS # BLD AUTO: 0.04 X10(3) UL (ref 0–0.2)
BASOPHILS NFR BLD AUTO: 0.4 %
EOSINOPHIL # BLD AUTO: 0.06 X10(3) UL (ref 0–0.7)
EOSINOPHIL NFR BLD AUTO: 0.6 %
ERYTHROCYTE [DISTWIDTH] IN BLOOD BY AUTOMATED COUNT: 18.9 %
GLUCOSE BLD-MCNC: 104 MG/DL (ref 70–99)
HCT VFR BLD AUTO: 36.8 %
HGB BLD-MCNC: 11.9 G/DL
IMM GRANULOCYTES # BLD AUTO: 0.1 X10(3) UL (ref 0–1)
IMM GRANULOCYTES NFR BLD: 0.9 %
LYMPHOCYTES # BLD AUTO: 2.17 X10(3) UL (ref 1–4)
LYMPHOCYTES NFR BLD AUTO: 20.4 %
MCH RBC QN AUTO: 22.2 PG (ref 26–34)
MCHC RBC AUTO-ENTMCNC: 32.3 G/DL (ref 31–37)
MCV RBC AUTO: 68.8 FL
MONOCYTES # BLD AUTO: 0.72 X10(3) UL (ref 0.1–1)
MONOCYTES NFR BLD AUTO: 6.8 %
NEUTROPHILS # BLD AUTO: 7.55 X10 (3) UL (ref 1.5–7.7)
NEUTROPHILS # BLD AUTO: 7.55 X10(3) UL (ref 1.5–7.7)
NEUTROPHILS NFR BLD AUTO: 70.9 %
PLATELET # BLD AUTO: 292 10(3)UL (ref 150–450)
RBC # BLD AUTO: 5.35 X10(6)UL
RH BLOOD TYPE: POSITIVE
T PALLIDUM AB SER QL IA: NONREACTIVE
WBC # BLD AUTO: 10.6 X10(3) UL (ref 4–11)

## 2024-08-23 PROCEDURE — 59400 OBSTETRICAL CARE: CPT | Performed by: STUDENT IN AN ORGANIZED HEALTH CARE EDUCATION/TRAINING PROGRAM

## 2024-08-23 PROCEDURE — 0KQM0ZZ REPAIR PERINEUM MUSCLE, OPEN APPROACH: ICD-10-PCS | Performed by: STUDENT IN AN ORGANIZED HEALTH CARE EDUCATION/TRAINING PROGRAM

## 2024-08-23 RX ORDER — IBUPROFEN 600 MG/1
600 TABLET, FILM COATED ORAL EVERY 6 HOURS
Status: DISCONTINUED | OUTPATIENT
Start: 2024-08-23 | End: 2024-08-25

## 2024-08-23 RX ORDER — LIDOCAINE HYDROCHLORIDE AND EPINEPHRINE 15; 5 MG/ML; UG/ML
INJECTION, SOLUTION EPIDURAL AS NEEDED
Status: DISCONTINUED | OUTPATIENT
Start: 2024-08-23 | End: 2024-08-23 | Stop reason: SURG

## 2024-08-23 RX ORDER — ENOXAPARIN SODIUM 100 MG/ML
40 INJECTION SUBCUTANEOUS DAILY
Status: DISCONTINUED | OUTPATIENT
Start: 2024-08-23 | End: 2024-08-23

## 2024-08-23 RX ORDER — SODIUM CHLORIDE, SODIUM LACTATE, POTASSIUM CHLORIDE, CALCIUM CHLORIDE 600; 310; 30; 20 MG/100ML; MG/100ML; MG/100ML; MG/100ML
INJECTION, SOLUTION INTRAVENOUS CONTINUOUS
Status: DISCONTINUED | OUTPATIENT
Start: 2024-08-23 | End: 2024-08-23

## 2024-08-23 RX ORDER — TERBUTALINE SULFATE 1 MG/ML
0.25 INJECTION, SOLUTION SUBCUTANEOUS AS NEEDED
Status: DISCONTINUED | OUTPATIENT
Start: 2024-08-23 | End: 2024-08-23

## 2024-08-23 RX ORDER — IBUPROFEN 600 MG/1
600 TABLET, FILM COATED ORAL ONCE AS NEEDED
Status: DISCONTINUED | OUTPATIENT
Start: 2024-08-23 | End: 2024-08-23

## 2024-08-23 RX ORDER — BUPIVACAINE HCL/0.9 % NACL/PF 0.25 %
5 PLASTIC BAG, INJECTION (ML) EPIDURAL AS NEEDED
Status: DISCONTINUED | OUTPATIENT
Start: 2024-08-23 | End: 2024-08-23

## 2024-08-23 RX ORDER — DEXTROSE, SODIUM CHLORIDE, SODIUM LACTATE, POTASSIUM CHLORIDE, AND CALCIUM CHLORIDE 5; .6; .31; .03; .02 G/100ML; G/100ML; G/100ML; G/100ML; G/100ML
INJECTION, SOLUTION INTRAVENOUS AS NEEDED
Status: DISCONTINUED | OUTPATIENT
Start: 2024-08-23 | End: 2024-08-23

## 2024-08-23 RX ORDER — NALBUPHINE HYDROCHLORIDE 10 MG/ML
2.5 INJECTION, SOLUTION INTRAMUSCULAR; INTRAVENOUS; SUBCUTANEOUS
Status: DISCONTINUED | OUTPATIENT
Start: 2024-08-23 | End: 2024-08-23

## 2024-08-23 RX ORDER — DOCUSATE SODIUM 100 MG/1
100 CAPSULE, LIQUID FILLED ORAL
Status: DISCONTINUED | OUTPATIENT
Start: 2024-08-23 | End: 2024-08-25

## 2024-08-23 RX ORDER — ACETAMINOPHEN 500 MG
500 TABLET ORAL EVERY 6 HOURS PRN
Status: DISCONTINUED | OUTPATIENT
Start: 2024-08-23 | End: 2024-08-23

## 2024-08-23 RX ORDER — CITRIC ACID/SODIUM CITRATE 334-500MG
30 SOLUTION, ORAL ORAL AS NEEDED
Status: DISCONTINUED | OUTPATIENT
Start: 2024-08-23 | End: 2024-08-23

## 2024-08-23 RX ORDER — ONDANSETRON 2 MG/ML
4 INJECTION INTRAMUSCULAR; INTRAVENOUS EVERY 6 HOURS PRN
Status: DISCONTINUED | OUTPATIENT
Start: 2024-08-23 | End: 2024-08-23

## 2024-08-23 RX ORDER — ACETAMINOPHEN 500 MG
1000 TABLET ORAL EVERY 6 HOURS PRN
Status: DISCONTINUED | OUTPATIENT
Start: 2024-08-23 | End: 2024-08-23

## 2024-08-23 RX ADMIN — LIDOCAINE HYDROCHLORIDE AND EPINEPHRINE 5 ML: 15; 5 INJECTION, SOLUTION EPIDURAL at 13:20:00

## 2024-08-23 NOTE — ANESTHESIA PREPROCEDURE EVALUATION
PRE-OP EVALUATION    Patient Name: Glendy Koo    Admit Diagnosis: PREGNANCY  Pregnancy (HCC)    Pre-op Diagnosis: * No pre-op diagnosis entered *        Anesthesia Procedure: LABOR ANALGESIA    * No surgeons found in log *    Pre-op vitals reviewed.  Temp: 97.8 °F (36.6 °C)  Pulse: 92  BP: 109/71  SpO2: 99 %  Body mass index is 35.85 kg/m².    Current medications reviewed.  Hospital Medications:   lactated ringers infusion   Intravenous Continuous    dextrose in lactated ringers 5% infusion   Intravenous PRN    lactated ringers IV bolus 500 mL  500 mL Intravenous PRN    enoxaparin (Lovenox) 40 MG/0.4ML SUBQ injection 40 mg  40 mg Subcutaneous Daily    acetaminophen (Tylenol Extra Strength) tab 500 mg  500 mg Oral Q6H PRN    acetaminophen (Tylenol Extra Strength) tab 1,000 mg  1,000 mg Oral Q6H PRN    ibuprofen (Motrin) tab 600 mg  600 mg Oral Once PRN    ondansetron (Zofran) 4 MG/2ML injection 4 mg  4 mg Intravenous Q6H PRN    oxyTOCIN in sodium chloride 0.9% (Pitocin) 30 Units/500mL infusion premix  62.5-900 breonna-units/min Intravenous Continuous    terbutaline (Brethine) 1 MG/ML injection 0.25 mg  0.25 mg Subcutaneous PRN    sodium citrate-citric acid (Bicitra) 500-334 MG/5ML oral solution 30 mL  30 mL Oral PRN    oxyTOCIN in sodium chloride 0.9% (Pitocin) 30 Units/500mL infusion premix  0.5-20 breonna-units/min Intravenous Continuous    lactated ringers IV bolus 1,000 mL  1,000 mL Intravenous Once    fentaNYL-bupivacaine 2 mcg/mL-0.125% in sodium chloride 0.9% 100 mL EPIDURAL infusion premix  12 mL/hr Epidural Continuous    [COMPLETED] fentaNYL (Sublimaze) 50 mcg/mL injection 100 mcg  100 mcg Epidural Once    EPHEDrine (PF) 25 MG/5 ML injection 5 mg  5 mg Intravenous PRN    nalbuphine (Nubain) 10 mg/mL injection 2.5 mg  2.5 mg Intravenous Q15 Min PRN       Outpatient Medications:     Medications Prior to Admission   Medication Sig Dispense Refill Last Dose    ferrous sulfate 325 (65 FE) MG Oral Tab EC Take  1 tablet (325 mg total) by mouth 3 (three) times a week.   2024    aspirin 81 MG Oral Tab EC Take 2 tablets (162 mg total) by mouth daily.   Past Week    prenatal vitamin with DHA 27-0.8-228 MG Oral Cap Take 1 capsule by mouth daily.   2024    Blood Glucose Monitoring Suppl (ONETOUCH VERIO FLEX SYSTEM) w/Device Does not apply Kit 1 each As Directed. 1 kit 0     Glucose Blood (ONETOUCH VERIO) In Vitro Strip Test blood sugar 4 times daily 400 strip 2     OneTouch Delica Lancets 30G Does not apply Misc 1 each 4 (four) times daily. 400 each 2        Allergies: Patient has no known allergies.      Anesthesia Evaluation    Patient summary reviewed.    Anesthetic Complications  (-) history of anesthetic complications         GI/Hepatic/Renal    Negative GI/hepatic/renal ROS.                             Cardiovascular        Exercise tolerance: good     MET: >4    (+) obesity                                       Endo/Other      (+) diabetes  gestational, not using insulin      (+) anemia                   Pulmonary    Negative pulmonary ROS.                       Neuro/Psych    Negative neuro/psych ROS.                          Patient Active Problem List:     History of anemia     Pregnancy (HCC)     Anemia     Iron deficiency anemia     Obesity     Multigravida of advanced maternal age in first trimester (HCC)     Antepartum multigravida of advanced maternal age (HCC)     Obesity affecting pregnancy, antepartum (HCC)     Gestational diabetes mellitus (GDM), antepartum (HCC)     NST (non-stress test) nonreactive  \        History reviewed. No pertinent surgical history.  Social History     Socioeconomic History    Marital status:    Tobacco Use    Smoking status: Former     Current packs/day: 0.00     Types: Cigarettes     Quit date: 3/4/2019     Years since quittin.4    Smokeless tobacco: Never    Tobacco comments:     once a month   Vaping Use    Vaping status: Former   Substance and Sexual Activity     Alcohol use: Never    Drug use: Never    Sexual activity: Yes     Partners: Male   Other Topics Concern    Caffeine Concern No    Exercise No    Seat Belt No    Special Diet No    Stress Concern No    Weight Concern Yes     History   Drug Use Unknown     Available pre-op labs reviewed.  Lab Results   Component Value Date    WBC 10.6 08/23/2024    RBC 5.35 (H) 08/23/2024    HGB 11.9 (L) 08/23/2024    HCT 36.8 08/23/2024    MCV 68.8 (L) 08/23/2024    MCH 22.2 (L) 08/23/2024    MCHC 32.3 08/23/2024    RDW 18.9 08/23/2024    .0 08/23/2024               Airway      Mallampati: II  Mouth opening: >3 FB  TM distance: > 6 cm  Neck ROM: full Cardiovascular    Cardiovascular exam normal.         Dental    Dentition appears grossly intact         Pulmonary    Pulmonary exam normal.                 Other findings              ASA: 2   Plan: epidural  NPO status verified and patient meets guidelines.        Comment: The risks and benefits of neuraxial anesthesia have been explained to the patient as outlined in the anesthesia consent.  Risks include but are not limited to: bleeding, infection, nerve damage, paresthesias, headaches, and incomplete block.  The consent was signed without further questions.      Plan/risks discussed with: patient                Present on Admission:  **None**

## 2024-08-23 NOTE — ANESTHESIA PROCEDURE NOTES
Labor Analgesia    Date/Time: 8/23/2024 1:08 PM    Performed by: Josef Ge MD  Authorized by: Josef Ge MD      General Information and Staff    Start Time:  8/23/2024 1:08 PM  End Time:  8/23/2024 1:20 PM  Anesthesiologist:  Josef Ge MD  Performed by:  Anesthesiologist  Patient Location:  OB  Reason for Block: labor epidural    Preanesthetic Checklist: patient identified, IV checked, risks and benefits discussed, monitors and equipment checked, pre-op evaluation, timeout performed, IV bolus, anesthesia consent and sterile technique used      Procedure Details    Patient Position:  Sitting  Prep: ChloraPrep    Monitoring:  Heart rate, cardiac monitor and continuous pulse ox  Approach:  Midline    Epidural Needle    Injection Technique:  MARITZA air  Needle Gauge:  17 G  Needle Length:  3.375 in  Location:  L3-4    Spinal Needle      Catheter    Catheter Type:  End hole  Catheter Size:  19 G  Catheter at Skin Depth:  12  Test Dose:  Negative    Assessment    Sensory Level:  T10    Additional Comments     Lidocaine 1%- 3ml infiltrated subcutaneously with 25 g needle  Lidocaine 1.5% with 1:200 K epi 3ml +2ml given via epidural for test dose  Fentanyl 100mcg given per epidural.

## 2024-08-23 NOTE — H&P
[facility]  Obstetrics and Gynecology  History & Physical    Glendy Koo Patient Status:  Inpatient    1990 MRN [id number]   Location [facility] LABOR & DELIVERY Attending Meredith Ashley MD   Hospital Day 0 PCP Rashaun Leon MD     CC: Patient is here for PROM, possible early labor    SUBJECTIVE:    Glendy Koo is a 34 year old  female at 38w5d who is being admitted for PROM, CTX.   Her current obstetrical history is significant for AMA, GDMA1, obesity.     Membranes ruptured around 0900 this AM. Pt is feeling regular CTX, feels they have gotten more painful  3cm in triage      SUELLEN Confirmation  LMP: Patient's last menstrual period was 2023.  SUELLEN: 2024, by Last Menstrual Period       Obstetric History:   OB History    Para Term  AB Living   4 1 1   2 1   SAB IAB Ectopic Multiple Live Births   2     0 1      # Outcome Date GA Lbr Bridger/2nd Weight Sex Type Anes PTL Lv   4 Current            3 SAB 2023           2 Term 20 38w2d 08:19 / 00:36 6 lb 9.8 oz (3 kg) M NORMAL SPONT EPI N GM      Complications: Variable decelerations, Late decelerations   1 2019 7w0d    SAB   FD     Past Medical History:   Past Medical History:    Amenorrhea    Positive pregnancy test    Anemia    Gestational diabetes (HCC)    diet controlled.     Pap smear for cervical cancer screening    Negative per Care Everywhere    Visual impairment    glasses and contact lenses     Past Social History: History reviewed. No pertinent surgical history.  Family History:   Family History   Problem Relation Age of Onset    Other (Lymphoma) Maternal Grandmother     Cancer Maternal Grandmother         Lymphnode cancer    Diabetes Paternal Grandmother      Social History:   Social History     Tobacco Use    Smoking status: Former     Current packs/day: 0.00     Types: Cigarettes     Quit date: 3/4/2019     Years since quittin.4    Smokeless tobacco: Never    Tobacco comments:      once a month   Substance Use Topics    Alcohol use: Never       Home Meds:   Medications Prior to Admission   Medication Sig Dispense Refill Last Dose    ferrous sulfate 325 (65 FE) MG Oral Tab EC Take 1 tablet (325 mg total) by mouth 3 (three) times a week.   2024    aspirin 81 MG Oral Tab EC Take 2 tablets (162 mg total) by mouth daily.   Past Week    prenatal vitamin with DHA 27-0.8-228 MG Oral Cap Take 1 capsule by mouth daily.   2024    Blood Glucose Monitoring Suppl (ONETOUCH VERIO FLEX SYSTEM) w/Device Does not apply Kit 1 each As Directed. 1 kit 0     Glucose Blood (ONETOUCH VERIO) In Vitro Strip Test blood sugar 4 times daily 400 strip 2     OneTouch Delica Lancets 30G Does not apply Misc 1 each 4 (four) times daily. 400 each 2      Allergies: No Known Allergies    OBJECTIVE:       Body mass index is 35.85 kg/m².    General: NAD  FHT: 130s/minimal to moderate variability/+acc/early decels  TOCO: q 2-4 minutes    SVE: 3/50/-2 per RN in triage    Prenatal Labs Brief Review   Blood Type:   Lab Results   Component Value Date    ABO A 2024    RH Positive 2024     GBS:  Negative      Inpatient labs:       ASSESSMENT/ PLAN:    Glendy Koo is a 34 year old  female at 38w5d Estimated Date of Delivery: 24 who is being admitted for PROM, possible labor.      1. Labor: will recheck cervix after 2h, if minimal change then recommended pitocin augmentation  2. Fetal monitoring: CEFM  3. GBS: neg  4. Pain: plans epidural  5. GDMA1 - glucose level pending    Risks, benefits, alternatives and possible complications have been discussed in detail with the patient.  Pre-admission, admission, and post admission procedures and expectations were discussed in detail.  All questions answered, all appropriate consents will be signed at the Hospital. Admission is planned for today.  anticipated.    Meredith Ashley MD

## 2024-08-23 NOTE — TELEPHONE ENCOUNTER
Patient states water broke around 9:00 am this morning and contractions started at the same time. Contractions every 5-6 min. Denies any bleeding but thinks she lost mucous plug. Positive fetal movement reported.   L&D notified patient is on her way.

## 2024-08-23 NOTE — PLAN OF CARE
Problem: BIRTH - VAGINAL/ SECTION  Goal: Fetal and maternal status remain reassuring during the birth process  Description: INTERVENTIONS:  - Monitor vital signs  - Monitor fetal heart rate  - Monitor uterine activity  - Monitor labor progression (vaginal delivery)  - DVT prophylaxis (C/S delivery)  - Surgical antibiotic prophylaxis (C/S delivery)  Outcome: Progressing     Problem: PAIN - ADULT  Goal: Verbalizes/displays adequate comfort level or patient's stated pain goal  Description: INTERVENTIONS:  - Encourage pt to monitor pain and request assistance  - Assess pain using appropriate pain scale  - Administer analgesics based on type and severity of pain and evaluate response  - Implement non-pharmacological measures as appropriate and evaluate response  - Consider cultural and social influences on pain and pain management  - Manage/alleviate anxiety  - Utilize distraction and/or relaxation techniques  - Monitor for opioid side effects  - Notify MD/LIP if interventions unsuccessful or patient reports new pain  - Anticipate increased pain with activity and pre-medicate as appropriate  Outcome: Progressing     Problem: ANXIETY  Goal: Will report anxiety at manageable levels  Description: INTERVENTIONS:  - Administer medication as ordered  - Teach and rehearse alternative coping skills  - Provide emotional support with 1:1 interaction with staff  Outcome: Progressing     Problem: Patient/Family Goals  Goal: Patient/Family Long Term Goal  Description: Patient's Long Term Goal: Safe delivery for mom and baby    Interventions:  -   - See additional Care Plan goals for specific interventions  Outcome: Progressing  Goal: Patient/Family Short Term Goal  Description: Patient's Short Term Goal: Adequate pain control    Interventions:   -   - See additional Care Plan goals for specific interventions  Outcome: Progressing

## 2024-08-23 NOTE — PROGRESS NOTES
Pt is a  at 38 5/7 wk iup who is brought to room triage-5 for r/o ROM and RO Labor. Pt reports feeling a pop and gush of fluid at approx 0900 this am. Pt reports uc's began with in approx 5 minutes. Pt reports uc's approx 5 minutes apart at this time. Pt reports +fm and denies any VB. EFM tested and applied. POC dicussed and questions answered.

## 2024-08-23 NOTE — PAYOR COMM NOTE
--------------  ADMISSION REVIEW     Payor: [employer] CHOICE/HMO/POS/EPO  Subscriber #:  [id number]  Authorization Number: [id number]    Admit date: 24  Admit time: 11:56 AM       [facility]  Obstetrics and Gynecology  History & Physical           Glendy Koo Patient Status:  Inpatient    1990 MRN [id number]   Location [facility] LABOR & DELIVERY Attending Meredith Ashley MD   Hospital Day 0 PCP Rashaun Leon MD      CC: Patient is here for PROM, possible early labor     SUBJECTIVE:     Glendy Koo is a 34 year old  female at 38w5d who is being admitted for PROM, CTX.   Her current obstetrical history is significant for AMA, GDMA1, obesity.      Membranes ruptured around 0900 this AM. Pt is feeling regular CTX, feels they have gotten more painful  3cm in triage        SUELLEN Confirmation  LMP: Patient's last menstrual period was 2023.  SUELLEN: 2024, by Last Menstrual Period         Obstetric History:                    OB History    Para Term  AB Living   4 1 1   2 1   SAB IAB Ectopic Multiple Live Births      2     0 1          # Outcome Date GA Lbr Bridger/2nd Weight Sex Type Anes PTL Lv   4 Current                     3 2023                   2 Term 20 38w2d 08:19 / 00:36 6 lb 9.8 oz (3 kg) M NORMAL SPONT EPI N GM      Complications: Variable decelerations, Late decelerations   1 2019 7w0d       SAB     FD      Past Medical History:   Past Medical History       Past Medical History:    Amenorrhea     Positive pregnancy test    Anemia    Gestational diabetes (HCC)     diet controlled.     Pap smear for cervical cancer screening     Negative per Care Everywhere    Visual impairment     glasses and contact lenses         Past Social History:   Past Surgical History   History reviewed. No pertinent surgical history.     Family History:   Family History         Family History   Problem Relation Age of Onset    Other (Lymphoma)  Maternal Grandmother      Cancer Maternal Grandmother           Lymphnode cancer    Diabetes Paternal Grandmother           Social History:   Social History            Tobacco Use    Smoking status: Former       Current packs/day: 0.00       Types: Cigarettes       Quit date: 3/4/2019       Years since quittin.4    Smokeless tobacco: Never    Tobacco comments:       once a month   Substance Use Topics    Alcohol use: Never         Home Meds:   Prescriptions Prior to Admission           Medications Prior to Admission   Medication Sig Dispense Refill Last Dose    ferrous sulfate 325 (65 FE) MG Oral Tab EC Take 1 tablet (325 mg total) by mouth 3 (three) times a week.     2024    aspirin 81 MG Oral Tab EC Take 2 tablets (162 mg total) by mouth daily.     Past Week    prenatal vitamin with DHA 27-0.8-228 MG Oral Cap Take 1 capsule by mouth daily.     2024    Blood Glucose Monitoring Suppl (ONETOUCH VERIO FLEX SYSTEM) w/Device Does not apply Kit 1 each As Directed. 1 kit 0      Glucose Blood (ONETOUCH VERIO) In Vitro Strip Test blood sugar 4 times daily 400 strip 2      OneTouch Delica Lancets 30G Does not apply Misc 1 each 4 (four) times daily. 400 each 2           Allergies:   Allergies   No Known Allergies        OBJECTIVE:     Body mass index is 35.85 kg/m².     General: NAD  FHT: 130s/minimal to moderate variability/+acc/early decels  TOCO: q 2-4 minutes     SVE: 3/50/-2 per RN in triage     Prenatal Labs Brief Review   Blood Type:         Lab Results   Component Value Date     ABO A 2024     RH Positive 2024      GBS:  Negative        Inpatient labs:     ASSESSMENT/ PLAN:     Glendy Koo is a 34 year old  female at 38w5d Estimated Date of Delivery: 24 who is being admitted for PROM, possible labor.        1. Labor: will recheck cervix after 2h, if minimal change then recommended pitocin augmentation  2. Fetal monitoring: CEFM  3. GBS: neg  4. Pain: plans epidural  5. GDMA1  - glucose level pending     Risks, benefits, alternatives and possible complications have been discussed in detail with the patient.  Pre-admission, admission, and post admission procedures and expectations were discussed in detail.  All questions answered, all appropriate consents will be signed at the Hospital. Admission is planned for today.  anticipated.     Meredith Ashley MD         MEDICATIONS ADMINISTERED IN LAST 1 DAY:  lactated ringers infusion       Date Action Dose Route User    2024 1350 New Bag (none) Intravenous Caity Villalta RN    2024 1221 New Bag (none) Intravenous Caity Villalta RN            Vitals (last day)       Date/Time Temp Pulse Resp BP SpO2 Weight O2 Device O2 Flow Rate (L/min) [facility]    24 1430 -- 84 -- 113/72 -- -- -- -- AS    24 1415 -- 90 -- 117/69 -- 196 lb (88.9 kg) -- -- AS    24 1400 97.8 °F (36.6 °C) 89 -- 123/ -- -- -- -- AS    24 1345 -- 110 -- 127/62 99 % -- -- -- AS    24 1340 -- 96 -- 133/79 -- -- -- -- AS    24 1335 -- 102 -- 134/74 -- -- -- -- AS    24 1329 -- 107 -- 123 -- -- -- -- AS    24 1326 -- 114 -- 116/67 -- -- -- -- AS    24 1323 -- 112 -- 124/72 -- -- -- -- AS    24 1321 -- 106 -- 129/70 -- -- -- -- AS    24 1318 -- 103 -- 140/79 -- -- -- -- AS    24 1314 -- 86 -- 149/98 -- -- -- -- AS    24 1149 -- -- -- -- -- 196 lb (88.9 kg) -- -- JN    24 1109 -- -- -- -- -- 196 lb (88.9 kg) -- -- JN          CIWA Scores (since admission)       None

## 2024-08-23 NOTE — TELEPHONE ENCOUNTER
FYI- Patient called stating her water broke and she is heading into L&D now, wanted to notify office.

## 2024-08-24 LAB
BASOPHILS # BLD AUTO: 0.04 X10(3) UL (ref 0–0.2)
BASOPHILS NFR BLD AUTO: 0.3 %
EOSINOPHIL # BLD AUTO: 0.09 X10(3) UL (ref 0–0.7)
EOSINOPHIL NFR BLD AUTO: 0.6 %
ERYTHROCYTE [DISTWIDTH] IN BLOOD BY AUTOMATED COUNT: 18.5 %
HCT VFR BLD AUTO: 32.5 %
HGB BLD-MCNC: 10 G/DL
IMM GRANULOCYTES # BLD AUTO: 0.13 X10(3) UL (ref 0–1)
IMM GRANULOCYTES NFR BLD: 0.9 %
LYMPHOCYTES # BLD AUTO: 2.43 X10(3) UL (ref 1–4)
LYMPHOCYTES NFR BLD AUTO: 17 %
MCH RBC QN AUTO: 21.7 PG (ref 26–34)
MCHC RBC AUTO-ENTMCNC: 30.8 G/DL (ref 31–37)
MCV RBC AUTO: 70.7 FL
MONOCYTES # BLD AUTO: 1.04 X10(3) UL (ref 0.1–1)
MONOCYTES NFR BLD AUTO: 7.3 %
NEUTROPHILS # BLD AUTO: 10.57 X10 (3) UL (ref 1.5–7.7)
NEUTROPHILS # BLD AUTO: 10.57 X10(3) UL (ref 1.5–7.7)
NEUTROPHILS NFR BLD AUTO: 73.9 %
PLATELET # BLD AUTO: 260 10(3)UL (ref 150–450)
RBC # BLD AUTO: 4.6 X10(6)UL
WBC # BLD AUTO: 14.3 X10(3) UL (ref 4–11)

## 2024-08-24 RX ORDER — CHOLECALCIFEROL (VITAMIN D3) 25 MCG
1 TABLET,CHEWABLE ORAL DAILY
Status: DISCONTINUED | OUTPATIENT
Start: 2024-08-24 | End: 2024-08-25

## 2024-08-24 RX ORDER — ACETAMINOPHEN 500 MG
500 TABLET ORAL EVERY 6 HOURS PRN
Status: DISCONTINUED | OUTPATIENT
Start: 2024-08-24 | End: 2024-08-25

## 2024-08-24 RX ORDER — SIMETHICONE 80 MG
80 TABLET,CHEWABLE ORAL 3 TIMES DAILY PRN
Status: DISCONTINUED | OUTPATIENT
Start: 2024-08-24 | End: 2024-08-25

## 2024-08-24 RX ORDER — ACETAMINOPHEN 500 MG
1000 TABLET ORAL EVERY 6 HOURS PRN
Status: DISCONTINUED | OUTPATIENT
Start: 2024-08-24 | End: 2024-08-25

## 2024-08-24 RX ORDER — BISACODYL 10 MG
10 SUPPOSITORY, RECTAL RECTAL ONCE AS NEEDED
Status: DISCONTINUED | OUTPATIENT
Start: 2024-08-24 | End: 2024-08-25

## 2024-08-24 NOTE — PLAN OF CARE
Problem: BIRTH - VAGINAL/ SECTION  Goal: Fetal and maternal status remain reassuring during the birth process  Description: INTERVENTIONS:  - Monitor vital signs  - Monitor fetal heart rate  - Monitor uterine activity  - Monitor labor progression (vaginal delivery)  - DVT prophylaxis (C/S delivery)  - Surgical antibiotic prophylaxis (C/S delivery)  Outcome: Completed     Problem: PAIN - ADULT  Goal: Verbalizes/displays adequate comfort level or patient's stated pain goal  Description: INTERVENTIONS:  - Encourage pt to monitor pain and request assistance  - Assess pain using appropriate pain scale  - Administer analgesics based on type and severity of pain and evaluate response  - Implement non-pharmacological measures as appropriate and evaluate response  - Consider cultural and social influences on pain and pain management  - Manage/alleviate anxiety  - Utilize distraction and/or relaxation techniques  - Monitor for opioid side effects  - Notify MD/LIP if interventions unsuccessful or patient reports new pain  - Anticipate increased pain with activity and pre-medicate as appropriate  Outcome: Completed     Problem: ANXIETY  Goal: Will report anxiety at manageable levels  Description: INTERVENTIONS:  - Administer medication as ordered  - Teach and rehearse alternative coping skills  - Provide emotional support with 1:1 interaction with staff  Outcome: Completed     Problem: Patient/Family Goals  Goal: Patient/Family Long Term Goal  Description: Patient's Long Term Goal: Safe delivery for mom and baby  Interventions:  - See additional Care Plan goals for specific interventions  Outcome: Completed  Goal: Patient/Family Short Term Goal  Description: Patient's Short Term Goal: Adequate pain control  Interventions:   - See additional Care Plan goals for specific interventions  Outcome: Completed

## 2024-08-24 NOTE — PROGRESS NOTES
Pt to room 2210, bands checked with two Rns. POC reviewed, questions answered. Call light within reach.

## 2024-08-24 NOTE — PROGRESS NOTES
Patient up to bathroom with assist x 2, able to void at this time. Patient transferred to mother/baby room 2210 per wheelchair in stable condition with baby and personal belongings.  Accompanied by  and staff.  Report given to Sofia FLOOD.  Bands checked and verified.

## 2024-08-24 NOTE — PLAN OF CARE
Problem: POSTPARTUM  Goal: Optimize infant feeding at the breast  Description: INTERVENTIONS:  - Initiate breast feeding within first hour after birth.   - Monitor effectiveness of current breast feeding efforts.  - Assess support systems available to mother/family.  - Identify cultural beliefs/practices regarding lactation, letdown techniques, maternal food preferences.  - Assess mother's knowledge and previous experience with breast feeding.  - Provide information as needed about early infant feeding cues (e.g., rooting, lip smacking, sucking fingers/hand) versus late cue of crying.  - Discuss/demonstrate breast feeding aids (e.g., infant sling, nursing footstool/pillows, and breast pumps).  - Encourage mother/other family members to express feelings/concerns, and actively listen.  - Educate father/SO about benefits of breast feeding and how to manage common lactation challenges.  - Recommend avoidance of specific medications or substances incompatible with breast feeding.  - Assess and monitor for signs of nipple pain/trauma.  - Instruct and provide assistance with proper latch.  - Review techniques for milk expression (breast pumping) and storage of breast milk. Provide pumping equipment/supplies, instructions and assistance, as needed.  - Encourage rooming-in and breast feeding on demand.  - Encourage skin-to-skin contact.  - Provide LC support as needed.  - Assess for and manage engorgement.  - Provide breast feeding education handouts and information on community breast feeding support.   Outcome: Not Progressing     Problem: POSTPARTUM  Goal: Establishment of adequate milk supply with medication/procedure interruptions  Description: INTERVENTIONS:  - Review techniques for milk expression (breast pumping).   - Provide pumping equipment/supplies, instructions, and assistance until it is safe to breastfeed infant.  Outcome: Not Progressing     Problem: POSTPARTUM  Goal: Appropriate maternal -   bonding  Description: INTERVENTIONS:  - Assess caregiver- interactions.  - Assess caregiver's emotional status and coping mechanisms.  - Encourage caregiver to participate in  daily care.  - Assess support systems available to mother/family.  - Provide /case management support as needed.  Outcome: Not Progressing

## 2024-08-24 NOTE — PROGRESS NOTES
Labor Analgesia Follow Up Note    Patient underwent epidural anesthesia for labor analgesia,    Placenta Date/Time: 8/23/2024  8:08 PM     Delivery Date/Time:: 8/23/2024   8:03 PM     /85 (BP Location: Right arm)   Pulse 83   Temp 97.8 °F (36.6 °C) (Oral)   Resp 16   Ht 1.575 m (5' 2\")   Wt 88.9 kg (196 lb)   LMP 11/26/2023   SpO2 100%   Breastfeeding Yes   BMI 35.85 kg/m²     Assessment:  Patient seen and no apparent anesthesia related complications.    Thank you for asking us to participate in the care of your patient.

## 2024-08-24 NOTE — PROGRESS NOTES
PPD#1  Patient has no complaints, lochia minimal    /85 (BP Location: Right arm)   Pulse 83   Temp 97.8 °F (36.6 °C) (Oral)   Resp 16   Ht 62\"   Wt 196 lb (88.9 kg)   LMP 2023   SpO2 100%   Breastfeeding Yes   BMI 35.85 kg/m²     Recent Labs   Lab 24  1149 24  0802   RBC 5.35* 4.60   HGB 11.9* 10.0*   HCT 36.8 32.5*   MCV 68.8* 70.7*   MCH 22.2* 21.7*   MCHC 32.3 30.8*   RDW 18.9 18.5   NEPRELIM 7.55 10.57*   WBC 10.6 14.3*   .0 260.0       Abdomen;: soft, NT/ND  Uterus: firm, below umbilicus    A/P: s/p   - doing well  - home tomorrow

## 2024-08-24 NOTE — L&D DELIVERY NOTE
Hayes, Girl [[id number]]      Labor Events     labor?: No   steroids?: None  Antibiotics received during labor?: No  Rupture date/time: 2024 0900     Rupture type: SROM  Fluid color: Clear  Labor type: Spontaneous Onset of Labor  Augmentation: Oxytocin  Indications for augmentation: Ineffective Contraction Pattern  Intrapartum & labor complications: Late decelerations, Variable decelerations       Labor Event Times    Labor onset date/time: 2024 1345  Dilation complete date/time: 2024  Start pushing date/time: 2024        Presentation    Presentation: Vertex  Position: Left Occiput Anterior       Operative Delivery    Operative Vaginal Delivery: No                Shoulder Dystocia    Shoulder Dystocia: No       Anesthesia    Method: Epidural               Delivery      Head delivery date/time: 2024 20:03:14   Delivery date/time:  24 20:03:38   Delivery type: Normal spontaneous vaginal delivery    Details:     Delivery location: delivery room       Delivery Providers    Delivering Clinician: Meredith Ashley MD   Delivery personnel:  Provider Role   Lyn Lin RN Baby Nurse   Maddie Phelps RN Delivery Nurse             Cord    Vessels: 3 Vessels  Complications: None  Timed cord clamping: Yes  Time in sec: 45  Cord blood disposition: to lab  Gases sent?: No       Resuscitation    Method: Suctioning        Measurements      Weight: 3610 g 7 lb 15.3 oz      Head circum.: 33 cm Chest circum.: 32.5 cm      Abdominal circum.: 31.5 cm           Placenta    Date/time: 2024  Removal: Spontaneous  Appearance: Intact  Disposition: held for future pathology       Apgars    Living status: Living   Apgar Scoring Key:    0 1 2    Skin color Blue or pale Acrocyanotic Completely pink    Heart rate Absent <100 bpm >100 bpm    Reflex irritability No response Grimace Cry or active withdrawal    Muscle tone Limp Some flexion Active  motion    Respiratory effort Absent Weak cry; hypoventilation Good, crying              1 Minute:  5 Minute:  10 Minute:  15 Minute:  20 Minute:      Skin color: 0  1       Heart rate: 2  2       Reflex irritablity: 2  2       Muscle tone: 2  2       Respiratory effort: 2  2       Total: 8  9          Apgars assigned by: JEMMA BUSTILLOS RN  Two Dot disposition: with mother       Skin to Skin    Skin to skin initiated date/time: 2024  Skin to skin with: Mother       Vaginal Count    Initial count RN: Caity Villalta RN  Initial count Tech: Sofia Harrison    Initial counts 11   0    Final counts 11   1    Final count RN: Maddie Phelps RN  Final count MD: Meredith Ashley MD       Lacerations    Episiotomy: None  Perineal lacerations: 2nd Repaired?: Yes     Vaginal laceration?: No      Cervical laceration?: No    Clitoral laceration?: No    Quantitative blood loss (mL): 201            34 year old  at 38w5d admitted for labor with ruptured membranes. Pregnancy significant for GDMA1, AMA, obesity. Amniotomy of forebag performed then labor managed expectantly. Labor eventually stalled at 7.5-8cm for 4 hours, started pitocin for augmentation, pt then progressed to complete. Pushed over 3-4 contractions to bring the fetal head to . As the head was delivered the perineum was supported to decrease the risk of tearing.      The head delivered MERNA but the anterior shoulder did not deliver easily, then the head was restituted to LUIS MIGUEL (as the infant's right shoulder was actually anterior) after which the shoulders and remainder of the infant followed without difficulty to complete uncomplicated  of vigorous female infant, APGARS 8/9, weight 3610g. The infant was dried and suctioned. Cord clamping delayed and infant placed on maternal abdomen.  Placenta delivered spontaneously, intact. Small 2nd degree laceration repaired with 3-0 vicryl.  mL.    Meredith Ashley MD

## 2024-08-25 VITALS
TEMPERATURE: 99 F | HEART RATE: 100 BPM | SYSTOLIC BLOOD PRESSURE: 118 MMHG | RESPIRATION RATE: 17 BRPM | BODY MASS INDEX: 36.07 KG/M2 | OXYGEN SATURATION: 100 % | HEIGHT: 62 IN | DIASTOLIC BLOOD PRESSURE: 74 MMHG | WEIGHT: 196 LBS

## 2024-08-25 RX ORDER — AMOXICILLIN AND CLAVULANATE POTASSIUM 500; 125 MG/1; MG/1
500 TABLET, FILM COATED ORAL EVERY 12 HOURS SCHEDULED
Qty: 14 TABLET | Refills: 0 | Status: SHIPPED | OUTPATIENT
Start: 2024-08-25

## 2024-08-25 RX ORDER — AMOXICILLIN AND CLAVULANATE POTASSIUM 500; 125 MG/1; MG/1
500 TABLET, FILM COATED ORAL EVERY 12 HOURS SCHEDULED
Status: DISCONTINUED | OUTPATIENT
Start: 2024-08-25 | End: 2024-08-25

## 2024-08-25 NOTE — PROGRESS NOTES
Discharge patient home as order. Teaching complete, patient feel comfortable in taking care of herself and  infant. Hugs off, send both mom and infant to their family car @ [phone].

## 2024-08-27 ENCOUNTER — TELEPHONE (OUTPATIENT)
Dept: OBGYN UNIT | Facility: HOSPITAL | Age: 34
End: 2024-08-27

## 2024-08-30 ENCOUNTER — PATIENT OUTREACH (OUTPATIENT)
Dept: CASE MANAGEMENT | Age: 34
End: 2024-08-30

## 2024-08-30 NOTE — PROGRESS NOTES
OBGYN Post Partum apt request (delivered 08/23)    Dr Meredith Ashley  [facility]  [address] [place] [id number] [phone]  Follow up 6 weeks  LVM to call [phone] to assist w/scheduling apt

## 2024-09-03 NOTE — PROGRESS NOTES
OBGYN Post Partum apt request (delivered 08/23)     Dr Meredith Ashley  [facility]  [address] [place] [id number] [phone]  Follow up 6 weeks  Multiple attempts w/no calls back; no apt made  Closing encounter

## (undated) NOTE — LETTER
Dear new mom:    We've missed you! The nurses of [facility] have tried to reach you by phone to ask if you had any questions regarding your health or the care of your new little one.     Please feel free to call your doctor with an

## (undated) NOTE — MR AVS SNAPSHOT
[facility]  [address] [facility] [id number] [phone]               Thank you for choosing us for your health care visit with ARMAND Cortes. We are glad to serve you and happy to provide you with this summary of your visit.   Please h Pressure from the buildup of pus or fluid in the ear sometimes causes the eardrum to tear (rupture). The eardrum is a thin membrane that separates the delicate parts of the middle ear from the air and moisture in the ear canal.   What are the symptoms?    Emma Hardy Follow your healthcare provider's instructions. If you are taking an antibiotic, take all of it according to the directions, even when the symptoms have gone away before you have finished it.    To help relieve pain, put a warm moist washcloth or a hot wa Ask if using decongestants when you have a cold may help prevent you from getting ear infections. Developed by [address]   Published by [address]. Last modified: 2008-01-15       Influenza (Adult)    Influenza is also called the flu.  It is a viral Follow up with your healthcare provider, or as advised, if you are not getting better over the next week. If you are 72 or older, talk with your provider about getting a pneumococcal vaccine every 5 years.  You should also get this vaccine if you have  [facility], [address] [facility]     Phone:  [phone]    - amoxicillin 875 MG Tabs  - Fluticasone Propionate 50 MCG/ACT Susp            MyChart     Sign up for [facility], your secure online medical record.   Chikis Dougherty HOW TO GET STARTED: HOW TO STAY MOTIVATED:   Start activities slowly and build up over time Do what you like   Get your heart pumping – brisk walking, biking, swimming Even 10 minute increments are effective and add up over the week   2 ½ hours per week –